# Patient Record
Sex: FEMALE | Race: WHITE | NOT HISPANIC OR LATINO
[De-identification: names, ages, dates, MRNs, and addresses within clinical notes are randomized per-mention and may not be internally consistent; named-entity substitution may affect disease eponyms.]

---

## 2018-10-12 ENCOUNTER — RESULT REVIEW (OUTPATIENT)
Age: 33
End: 2018-10-12

## 2018-11-30 ENCOUNTER — INPATIENT (INPATIENT)
Facility: HOSPITAL | Age: 33
LOS: 2 days | Discharge: HOME | End: 2018-12-03
Attending: INTERNAL MEDICINE | Admitting: INTERNAL MEDICINE
Payer: MEDICARE

## 2018-11-30 VITALS — HEIGHT: 66 IN | WEIGHT: 160.06 LBS

## 2018-11-30 DIAGNOSIS — I26.99 OTHER PULMONARY EMBOLISM WITHOUT ACUTE COR PULMONALE: ICD-10-CM

## 2018-11-30 LAB
ALBUMIN SERPL ELPH-MCNC: 5.1 G/DL — SIGNIFICANT CHANGE UP (ref 3.5–5.2)
ALP SERPL-CCNC: 110 U/L — SIGNIFICANT CHANGE UP (ref 30–115)
ALT FLD-CCNC: 21 U/L — SIGNIFICANT CHANGE UP (ref 0–41)
ANION GAP SERPL CALC-SCNC: 15 MMOL/L — HIGH (ref 7–14)
APTT BLD: 25.6 SEC — LOW (ref 27–39.2)
AST SERPL-CCNC: 19 U/L — SIGNIFICANT CHANGE UP (ref 0–41)
BASE EXCESS BLDV CALC-SCNC: 3.1 MMOL/L — HIGH (ref -2–2)
BASOPHILS # BLD AUTO: 0.06 K/UL — SIGNIFICANT CHANGE UP (ref 0–0.2)
BASOPHILS NFR BLD AUTO: 0.6 % — SIGNIFICANT CHANGE UP (ref 0–1)
BILIRUB SERPL-MCNC: 0.9 MG/DL — SIGNIFICANT CHANGE UP (ref 0.2–1.2)
BUN SERPL-MCNC: 14 MG/DL — SIGNIFICANT CHANGE UP (ref 10–20)
CA-I SERPL-SCNC: 1.27 MMOL/L — SIGNIFICANT CHANGE UP (ref 1.12–1.3)
CALCIUM SERPL-MCNC: 9.8 MG/DL — SIGNIFICANT CHANGE UP (ref 8.5–10.1)
CHLORIDE SERPL-SCNC: 102 MMOL/L — SIGNIFICANT CHANGE UP (ref 98–110)
CK MB CFR SERPL CALC: <1 NG/ML — SIGNIFICANT CHANGE UP (ref 0.6–6.3)
CK MB CFR SERPL CALC: <1 NG/ML — SIGNIFICANT CHANGE UP (ref 0.6–6.3)
CK SERPL-CCNC: 80 U/L — SIGNIFICANT CHANGE UP (ref 0–225)
CK SERPL-CCNC: 83 U/L — SIGNIFICANT CHANGE UP (ref 0–225)
CO2 SERPL-SCNC: 26 MMOL/L — SIGNIFICANT CHANGE UP (ref 17–32)
CREAT SERPL-MCNC: 0.8 MG/DL — SIGNIFICANT CHANGE UP (ref 0.7–1.5)
D DIMER BLD IA.RAPID-MCNC: 1929 NG/ML DDU — HIGH (ref 0–230)
EOSINOPHIL # BLD AUTO: 0.27 K/UL — SIGNIFICANT CHANGE UP (ref 0–0.7)
EOSINOPHIL NFR BLD AUTO: 2.8 % — SIGNIFICANT CHANGE UP (ref 0–8)
GAS PNL BLDV: 139 MMOL/L — SIGNIFICANT CHANGE UP (ref 136–145)
GAS PNL BLDV: SIGNIFICANT CHANGE UP
GLUCOSE SERPL-MCNC: 98 MG/DL — SIGNIFICANT CHANGE UP (ref 70–99)
HCO3 BLDV-SCNC: 31 MMOL/L — HIGH (ref 22–29)
HCT VFR BLD CALC: 45.5 % — SIGNIFICANT CHANGE UP (ref 37–47)
HCT VFR BLDA CALC: 73.1 % — HIGH (ref 34–44)
HGB BLD CALC-MCNC: 23.8 G/DL — CRITICAL HIGH (ref 14–18)
HGB BLD-MCNC: 15 G/DL — SIGNIFICANT CHANGE UP (ref 12–16)
HOROWITZ INDEX BLDV+IHG-RTO: 21 — SIGNIFICANT CHANGE UP
IMM GRANULOCYTES NFR BLD AUTO: 0.3 % — SIGNIFICANT CHANGE UP (ref 0.1–0.3)
INR BLD: 1.2 RATIO — SIGNIFICANT CHANGE UP (ref 0.65–1.3)
LACTATE BLDV-MCNC: 1.4 MMOL/L — SIGNIFICANT CHANGE UP (ref 0.5–1.6)
LYMPHOCYTES # BLD AUTO: 2.27 K/UL — SIGNIFICANT CHANGE UP (ref 1.2–3.4)
LYMPHOCYTES # BLD AUTO: 23.3 % — SIGNIFICANT CHANGE UP (ref 20.5–51.1)
MCHC RBC-ENTMCNC: 28.5 PG — SIGNIFICANT CHANGE UP (ref 27–31)
MCHC RBC-ENTMCNC: 33 G/DL — SIGNIFICANT CHANGE UP (ref 32–37)
MCV RBC AUTO: 86.5 FL — SIGNIFICANT CHANGE UP (ref 81–99)
MONOCYTES # BLD AUTO: 0.71 K/UL — HIGH (ref 0.1–0.6)
MONOCYTES NFR BLD AUTO: 7.3 % — SIGNIFICANT CHANGE UP (ref 1.7–9.3)
NEUTROPHILS # BLD AUTO: 6.39 K/UL — SIGNIFICANT CHANGE UP (ref 1.4–6.5)
NEUTROPHILS NFR BLD AUTO: 65.7 % — SIGNIFICANT CHANGE UP (ref 42.2–75.2)
NRBC # BLD: 0 /100 WBCS — SIGNIFICANT CHANGE UP (ref 0–0)
NT-PROBNP SERPL-SCNC: 25 PG/ML — SIGNIFICANT CHANGE UP (ref 0–300)
NT-PROBNP SERPL-SCNC: 26 PG/ML — SIGNIFICANT CHANGE UP (ref 0–300)
PCO2 BLDV: 56 MMHG — HIGH (ref 41–51)
PH BLDV: 7.36 — SIGNIFICANT CHANGE UP (ref 7.26–7.43)
PLATELET # BLD AUTO: 260 K/UL — SIGNIFICANT CHANGE UP (ref 130–400)
PO2 BLDV: 21 MMHG — SIGNIFICANT CHANGE UP (ref 20–40)
POTASSIUM BLDV-SCNC: 4.3 MMOL/L — SIGNIFICANT CHANGE UP (ref 3.3–5.6)
POTASSIUM SERPL-MCNC: 4.7 MMOL/L — SIGNIFICANT CHANGE UP (ref 3.5–5)
POTASSIUM SERPL-SCNC: 4.7 MMOL/L — SIGNIFICANT CHANGE UP (ref 3.5–5)
PROT SERPL-MCNC: 7.8 G/DL — SIGNIFICANT CHANGE UP (ref 6–8)
PROTHROM AB SERPL-ACNC: 13 SEC — HIGH (ref 9.95–12.87)
RBC # BLD: 5.26 M/UL — SIGNIFICANT CHANGE UP (ref 4.2–5.4)
RBC # FLD: 13.7 % — SIGNIFICANT CHANGE UP (ref 11.5–14.5)
SAO2 % BLDV: 28 % — SIGNIFICANT CHANGE UP
SODIUM SERPL-SCNC: 143 MMOL/L — SIGNIFICANT CHANGE UP (ref 135–146)
TROPONIN T SERPL-MCNC: <0.01 NG/ML — SIGNIFICANT CHANGE UP
WBC # BLD: 9.73 K/UL — SIGNIFICANT CHANGE UP (ref 4.8–10.8)
WBC # FLD AUTO: 9.73 K/UL — SIGNIFICANT CHANGE UP (ref 4.8–10.8)

## 2018-11-30 PROCEDURE — 93970 EXTREMITY STUDY: CPT | Mod: 26

## 2018-11-30 RX ORDER — ENOXAPARIN SODIUM 100 MG/ML
70 INJECTION SUBCUTANEOUS ONCE
Qty: 0 | Refills: 0 | Status: COMPLETED | OUTPATIENT
Start: 2018-11-30 | End: 2018-11-30

## 2018-11-30 RX ORDER — CHLORHEXIDINE GLUCONATE 213 G/1000ML
1 SOLUTION TOPICAL
Qty: 0 | Refills: 0 | Status: DISCONTINUED | OUTPATIENT
Start: 2018-11-30 | End: 2018-12-03

## 2018-11-30 RX ORDER — MORPHINE SULFATE 50 MG/1
4 CAPSULE, EXTENDED RELEASE ORAL ONCE
Qty: 0 | Refills: 0 | Status: DISCONTINUED | OUTPATIENT
Start: 2018-11-30 | End: 2018-11-30

## 2018-11-30 RX ORDER — SODIUM CHLORIDE 9 MG/ML
1000 INJECTION INTRAMUSCULAR; INTRAVENOUS; SUBCUTANEOUS
Qty: 0 | Refills: 0 | Status: DISCONTINUED | OUTPATIENT
Start: 2018-11-30 | End: 2018-12-01

## 2018-11-30 RX ORDER — ONDANSETRON 8 MG/1
4 TABLET, FILM COATED ORAL EVERY 8 HOURS
Qty: 0 | Refills: 0 | Status: DISCONTINUED | OUTPATIENT
Start: 2018-11-30 | End: 2018-12-03

## 2018-11-30 RX ORDER — WARFARIN SODIUM 2.5 MG/1
5 TABLET ORAL AT BEDTIME
Qty: 0 | Refills: 0 | Status: COMPLETED | OUTPATIENT
Start: 2018-11-30 | End: 2018-11-30

## 2018-11-30 RX ORDER — ENOXAPARIN SODIUM 100 MG/ML
70 INJECTION SUBCUTANEOUS
Qty: 0 | Refills: 0 | Status: DISCONTINUED | OUTPATIENT
Start: 2018-11-30 | End: 2018-12-03

## 2018-11-30 RX ORDER — ACETAMINOPHEN 500 MG
650 TABLET ORAL EVERY 6 HOURS
Qty: 0 | Refills: 0 | Status: DISCONTINUED | OUTPATIENT
Start: 2018-11-30 | End: 2018-12-03

## 2018-11-30 RX ADMIN — Medication 650 MILLIGRAM(S): at 22:14

## 2018-11-30 RX ADMIN — ENOXAPARIN SODIUM 70 MILLIGRAM(S): 100 INJECTION SUBCUTANEOUS at 22:16

## 2018-11-30 RX ADMIN — Medication 650 MILLIGRAM(S): at 16:35

## 2018-11-30 RX ADMIN — SODIUM CHLORIDE 100 MILLILITER(S): 9 INJECTION INTRAMUSCULAR; INTRAVENOUS; SUBCUTANEOUS at 18:52

## 2018-11-30 RX ADMIN — WARFARIN SODIUM 5 MILLIGRAM(S): 2.5 TABLET ORAL at 22:14

## 2018-11-30 RX ADMIN — Medication 650 MILLIGRAM(S): at 17:13

## 2018-11-30 RX ADMIN — ENOXAPARIN SODIUM 70 MILLIGRAM(S): 100 INJECTION SUBCUTANEOUS at 14:18

## 2018-11-30 RX ADMIN — Medication 650 MILLIGRAM(S): at 23:00

## 2018-11-30 RX ADMIN — ONDANSETRON 4 MILLIGRAM(S): 8 TABLET, FILM COATED ORAL at 17:12

## 2018-11-30 RX ADMIN — MORPHINE SULFATE 4 MILLIGRAM(S): 50 CAPSULE, EXTENDED RELEASE ORAL at 12:31

## 2018-11-30 NOTE — ED PROVIDER NOTE - NS ED ROS FT
GEN: (+) fever, (-)chills   HEENT: (-) vision changes, (-) HA, (-) sore throat  CV: (+) pleuritic chest pain, (-) palpitations, (-) edema  PULM: (+) cough, (-) wheezing, (-) dyspnea, (-) orthopnea, (-) hemoptysis   GI: (-) abdominal pain,(-) Nausea, (-) Vomiting, (-) Diarrhea, (-) Melena  NEURO: (-) weakness, (-) paresthesias, (-) syncope  : (-) dysuria, (-) frequency, (-) urgency  MS: (+) back pain, (-) joint pain, (-)myalgias, (-) swelling  SKIN: (-) rashes, (-) new lesions, (-) pruritus, (-) jaundice  HEME: (-) bleeding, (-) ecchymosis

## 2018-11-30 NOTE — ED PROVIDER NOTE - ATTENDING CONTRIBUTION TO CARE
I personally evaluated the patient. I reviewed the Resident’s or Physician Assistant’s note (as assigned above), and agree with the findings and plan except as documented in my note.     33 female here for chest pain and pleurisy. Pain is associated with cough and low grade fever responsive to tylenol.     Has been breastfeeding since a delivery 3 months ago. No other PMH    PE: female in no distress. noted to be hypoxic without supplemental oxygen on initial vitals, responsive to nasal cannula.  HEENT: non icteric. mucosa moist. CHEST: normal work of breathing. Right base with decreased sounds. No tachypnea. speech normal CV: pulses intact. SKIN: normal    Impression: hypoxia, pneumonia vs. pulmonary embolism    Plan: IV labs imaging supportive care and reevaluation

## 2018-11-30 NOTE — H&P ADULT - HISTORY OF PRESENT ILLNESS
32 yo female with PMH that includes recent delivery 9/2018 currently breastfeeding, who presented with right chest pain and right back pain 7/10 pleuritic in nature, of 1 day in duration, and asosciated with low grade temperatures. evaluation in th eER revealed hypoxemia with acute on chronic pulmonary embolism. Pulm / CCM saw in the eR and she is being admitted for anticoagulation and further management. 34 yo female with PMH that includes recent delivery 9/2018 currently breastfeeding, who presented with right chest pain and right back pain 7/10 pleuritic in nature, of 1 day in duration, and asosciated with low grade temperatures. evaluation in th eER revealed hypoxemia with acute on chronic pulmonary embolism. Pulm / CCM saw in the eR and she is being admitted for anticoagulation and further management.     no prior miscarriages  no prior pregnancy complications  no known family hx

## 2018-11-30 NOTE — H&P ADULT - NSHPLABSRESULTS_GEN_ALL_CORE
15.0   9.73  )-----------( 260      ( 30 Nov 2018 09:50 )             45.5       11-30    143  |  102  |  14  ----------------------------<  98  4.7   |  26  |  0.8    Ca    9.8      30 Nov 2018 09:50    TPro  7.8  /  Alb  5.1  /  TBili  0.9  /  DBili  x   /  AST  19  /  ALT  21  /  AlkPhos  110  11-30      CARDIAC MARKERS ( 30 Nov 2018 10:30 )  x     / <0.01 ng/mL / x     / x     / x            BNP 1929    INR 1.2    LA 1.4      CT chest:  Acute on chronic segmental and subsegmental pulmonary embolism involving   the right lower lobe and right upper lobe pulmonary arteries.  Serpiginous density in the right lower lobe, which may represent a   vascular malformation.      Le duplex:  No evidence of deep venous thrombosis or superficial thrombophlebitis in   the bilateral lower extremities.        CXR:  No radiographic evidence of acute cardiopulmonary disease.      EKG (prelim) NSR

## 2018-11-30 NOTE — CONSULT NOTE ADULT - SUBJECTIVE AND OBJECTIVE BOX
Patient is a 33y old  Female who presents with a chief complaint of pleuritic chest pain    HPI:    34 year odl 3 months postpartum presenting with pleuritic right sided chest pain.   CT chest in ED revealed acute on chornic right sided segmental and subsegmental PEs.  No evidence of strain on CT.  She received Lovenox therapeutic dose.  Duplex of the lower extremities was normal.      PAST MEDICAL & SURGICAL HISTORY:  No pertinent past medical history  No significant past surgical history      SOCIAL HX:   Smoking neg                         ETOH  neg                          Other  neg    FAMILY HISTORY:  :  No known cardiovacular family hisotry     ROS:  See HPI     Allergies    No Known Allergies    Intolerances          PHYSICAL EXAM    ICU Vital Signs Last 24 Hrs  T(C): 36.9 (30 Nov 2018 08:57), Max: 36.9 (30 Nov 2018 08:57)  T(F): 98.5 (30 Nov 2018 08:57), Max: 98.5 (30 Nov 2018 08:57)  HR: 78 (30 Nov 2018 08:57) (78 - 78)  BP: 116/56 (30 Nov 2018 08:57) (116/56 - 116/56)  BP(mean): --  ABP: --  ABP(mean): --  RR: 18 (30 Nov 2018 09:41) (17 - 18)  SpO2: 95% (30 Nov 2018 09:41) (89% - 95%)      General: In NAD   HEENT:  DEJA              Lymphatic system: No cervical LN   Lungs: Bilateral BS, clear  Cardiovascular: Regular  Gastrointestinal: Soft, Positive BS  Musculoskeletal: No clubbing.  Moves all extremities.  Full range of motion   Skin: Warm.  Intact  Neurological: No motor or sensory deficit         LABS:                          15.0   9.73  )-----------( 260      ( 30 Nov 2018 09:50 )             45.5                                               11-30    143  |  102  |  14  ----------------------------<  98  4.7   |  26  |  0.8    Ca    9.8      30 Nov 2018 09:50    TPro  7.8  /  Alb  5.1  /  TBili  0.9  /  DBili  x   /  AST  19  /  ALT  21  /  AlkPhos  110  11-30      PT/INR - ( 30 Nov 2018 14:07 )   PT: 13.00 sec;   INR: 1.20 ratio         PTT - ( 30 Nov 2018 14:07 )  PTT:25.6 sec                                           CARDIAC MARKERS ( 30 Nov 2018 10:30 )  x     / <0.01 ng/mL / x     / x     / x                                                LIVER FUNCTIONS - ( 30 Nov 2018 09:50 )  Alb: 5.1 g/dL / Pro: 7.8 g/dL / ALK PHOS: 110 U/L / ALT: 21 U/L / AST: 19 U/L / GGT: x                                                                                                                                       X-Rays   clear                                                                                  ECHO  Pending    MEDICATIONS  (STANDING):    MEDICATIONS  (PRN): Patient is a 33y old  Female who presents with a chief complaint of pleuritic chest pain    HPI:    34 year old 3 months postpartum presenting with pleuritic right sided chest pain.   CT chest in ED revealed acute on chornic right sided segmental and subsegmental PEs.  No evidence of strain on CT.  She received Lovenox therapeutic dose.  Duplex of the lower extremities was normal.      PAST MEDICAL & SURGICAL HISTORY:  No pertinent past medical history  No significant past surgical history      SOCIAL HX:   Smoking neg                         ETOH  neg                          Other  neg    FAMILY HISTORY:  :  No known cardiovacular family hisotry     ROS:  See HPI     Allergies    No Known Allergies    Intolerances          PHYSICAL EXAM    ICU Vital Signs Last 24 Hrs  T(C): 36.9 (30 Nov 2018 08:57), Max: 36.9 (30 Nov 2018 08:57)  T(F): 98.5 (30 Nov 2018 08:57), Max: 98.5 (30 Nov 2018 08:57)  HR: 78 (30 Nov 2018 08:57) (78 - 78)  BP: 116/56 (30 Nov 2018 08:57) (116/56 - 116/56)  BP(mean): --  ABP: --  ABP(mean): --  RR: 18 (30 Nov 2018 09:41) (17 - 18)  SpO2: 95% (30 Nov 2018 09:41) (89% - 95%)      General: In NAD   HEENT:  DEJA              Lymphatic system: No cervical LN   Lungs: Bilateral BS, clear  Cardiovascular: Regular  Gastrointestinal: Soft, Positive BS  Musculoskeletal: No clubbing.  Moves all extremities.  Full range of motion   Skin: Warm.  Intact  Neurological: No motor or sensory deficit         LABS:                          15.0   9.73  )-----------( 260      ( 30 Nov 2018 09:50 )             45.5                                               11-30    143  |  102  |  14  ----------------------------<  98  4.7   |  26  |  0.8    Ca    9.8      30 Nov 2018 09:50    TPro  7.8  /  Alb  5.1  /  TBili  0.9  /  DBili  x   /  AST  19  /  ALT  21  /  AlkPhos  110  11-30      PT/INR - ( 30 Nov 2018 14:07 )   PT: 13.00 sec;   INR: 1.20 ratio         PTT - ( 30 Nov 2018 14:07 )  PTT:25.6 sec                                           CARDIAC MARKERS ( 30 Nov 2018 10:30 )  x     / <0.01 ng/mL / x     / x     / x                                                LIVER FUNCTIONS - ( 30 Nov 2018 09:50 )  Alb: 5.1 g/dL / Pro: 7.8 g/dL / ALK PHOS: 110 U/L / ALT: 21 U/L / AST: 19 U/L / GGT: x                                                                                                                                       X-Rays   clear                                                                                  ECHO  Pending    MEDICATIONS  (STANDING):    MEDICATIONS  (PRN):

## 2018-11-30 NOTE — ED ADULT NURSE NOTE - NSIMPLEMENTINTERV_GEN_ALL_ED
Implemented All Universal Safety Interventions:  [place] to call system. Call bell, personal items and telephone within reach. Instruct patient to call for assistance. Room bathroom lighting operational. Non-slip footwear when patient is off stretcher. Physically safe environment: no spills, clutter or unnecessary equipment. Stretcher in lowest position, wheels locked, appropriate side rails in place.

## 2018-11-30 NOTE — H&P ADULT - NSHPPHYSICALEXAM_GEN_ALL_CORE
Vital Signs Last 24 Hrs  T(C): 38.1 (30 Nov 2018 16:12), Max: 38.1 (30 Nov 2018 16:12)  T(F): 100.6 (30 Nov 2018 16:12), Max: 100.6 (30 Nov 2018 16:12)  HR: 80 (30 Nov 2018 16:12) (78 - 80)  BP: 114/64 (30 Nov 2018 16:12) (114/64 - 116/56)  BP(mean): --  RR: 16 (30 Nov 2018 16:12) (16 - 18)  SpO2: 95% (30 Nov 2018 09:41) (89% - 95%)      PHYSICAL EXAM:  GENERAL: NAD, well-groomed, well-developed  HEAD = atraumatic, normoocehalic  EYES: conjunctiva and sclera clear  NECK: Supple, No JVD  NERVOUS SYSTEM:  Alert & Oriented X3, Good concentration  CHEST/LUNG: Clear to ausculatation bilaterally; No rales, rhonchi, wheezing, or rubs  HEART: Regular rate and rhythm; No murmurs, rubs, or gallops  ABDOMEN: Soft, Nontender, Nondistended; Bowel sounds present  EXTREMITIES:  BL No clubbing, cyanosis, or edema Vital Signs Last 24 Hrs  T(C): 38.1 (30 Nov 2018 16:12), Max: 38.1 (30 Nov 2018 16:12)  T(F): 100.6 (30 Nov 2018 16:12), Max: 100.6 (30 Nov 2018 16:12)  HR: 80 (30 Nov 2018 16:12) (78 - 80)  BP: 114/64 (30 Nov 2018 16:12) (114/64 - 116/56)  BP(mean): --  RR: 16 (30 Nov 2018 16:12) (16 - 18)  SpO2: 95% (30 Nov 2018 09:41) (89% - 95%)      PHYSICAL EXAM:  GENERAL: uncomfortable but no distress, well-groomed, well-developed  HEAD = atraumatic, normoocehalic  EYES: conjunctiva and sclera clear  NECK: Supple, No JVD  NERVOUS SYSTEM:  Alert & Oriented X3, Good concentration  CHEST/LUNG: Clear to ausculatation bilaterally; No rales, rhonchi, wheezing, or rubs. + right sided chest pain to movement and breathing. no rash over the right chest. no breast tendeess  HEART: Regular rate and rhythm; No murmurs, rubs, or gallops  ABDOMEN: Soft, Nontender, Nondistended; Bowel sounds present  EXTREMITIES:  BL No clubbing, cyanosis, or edema Vital Signs Last 24 Hrs  T(C): 38.1 (30 Nov 2018 16:12), Max: 38.1 (30 Nov 2018 16:12)  T(F): 100.6 (30 Nov 2018 16:12), Max: 100.6 (30 Nov 2018 16:12)  HR: 80 (30 Nov 2018 16:12) (78 - 80)  BP: 114/64 (30 Nov 2018 16:12) (114/64 - 116/56)  BP(mean): --  RR: 16 (30 Nov 2018 16:12) (16 - 18)  SpO2: 95% (30 Nov 2018 09:41) (89% - 95%)      PHYSICAL EXAM:  GENERAL: uncomfortable but no distress, well-groomed, well-developed  HEAD = atraumatic, normoocehalic  EYES: conjunctiva and sclera clear  NECK: Supple, No JVD  NERVOUS SYSTEM:  Alert & Oriented X3, Good concentration. CN 2-12 intact  CHEST/LUNG: Clear to ausculatation bilaterally; No rales, rhonchi, wheezing, or rubs. + right sided chest pain to movement and breathing. no rash over the right chest. no breast tendeess  HEART: Regular rate and rhythm; No murmurs, rubs, or gallops  ABDOMEN: Soft, Nontender, Nondistended; Bowel sounds present  EXTREMITIES:  BL No clubbing, cyanosis, or edema. no calf tenderess BLE

## 2018-11-30 NOTE — ED ADULT TRIAGE NOTE - CHIEF COMPLAINT QUOTE
patient C/o of right upper back pain that radiates to right side of chest, described as sharp, 7/10, that began yesterday night while breathing, denies shortness of breath but states can't deep breath because increases pain

## 2018-11-30 NOTE — ED PROVIDER NOTE - MEDICAL DECISION MAKING DETAILS
33 female here for pleurisy and hypoxia found to have acute on chronic PE with normal biomarkers and no end organ failure will admit for pulmonary embolism and start anticoagulation. 33 female here for pleurisy and hypoxia found to have acute on chronic PE with normal biomarkers and no end organ failure will admit for pulmonary embolism and start anticoagulation. Case d/w Dr. Navarro for disposition.

## 2018-11-30 NOTE — ED PROVIDER NOTE - PROGRESS NOTE DETAILS
MANNY Jackson: I was directly involved in the care and management of this patient while supervising PA Fellow vascular duplex negative as per tech

## 2018-11-30 NOTE — H&P ADULT - NSHPREVIEWOFSYSTEMS_GEN_ALL_CORE
says got morphine in the Er for chest pain  alleviated the chest pain, but got nauseated with a heachace  no dizziness  no voting  no blurry vision  got tylenol, with improvement in the headache    no chills. had a recent uri type symptms with dry coughing  ros othersie negatibve    no vaginal bleeding.  has not gotten period yet  tells me had a negative pregnancy test downstairs

## 2018-11-30 NOTE — CONSULT NOTE ADULT - ASSESSMENT
IMPRESSION:    Provoked PE ?   Risk is usually highest within the 6 weeks post-partum  No DVT      PLAN:    CNS: no depressants    HEENT: Oral care    PULMONARY:  HOB @ 45 degrees, start therapeutic Lovenox, can switch to coumadin, as NOACs are not recommended in breastfeeding period    CARDIOVASCULAR: check 2Decho, trend cardiac enzymes, check BNP    GI: GI prophylaxis.  Feeding PO diet    RENAL:  Follow up lytes.  Correct as needed    HEMATOLOGICAL:  Therapeutic Lovenox for now    MUSCULOSKELETAL: out of bed    Telemetry monitoring IMPRESSION:    Provoked PE ?   Risk is usually highest within the 6 weeks post-partum  No DVT  No R heart strain    PLAN:    CNS: no depressants    HEENT: Oral care    PULMONARY:  HOB @ 45 degrees,   start therapeutic Lovenox,   can switch to coumadin with bridging,   NOACs are not recommended in breastfeeding period    CARDIOVASCULAR: check 2Decho, trend cardiac enzymes, check BNP    GI: GI prophylaxis.  Feeding PO diet    RENAL:  Follow up lytes.  Correct as needed    HEMATOLOGICAL:  Therapeutic Lovenox for now    MUSCULOSKELETAL: out of bed    Telemetry monitoring

## 2018-11-30 NOTE — ED PROVIDER NOTE - PHYSICAL EXAMINATION
GEN: Alert & Oriented x 3, No acute distress. Calm, appropriate.  Head and Neck:  No cervical lymphadenopathy.  ENT: Oral mucosa pink, moist without lesions.   Eyes: PERRL. No conjunctival injection. No scleral icterus.   RESP: Decreased breath sounds over right lung field. Lungs clear to auscult bilat. no wheezes, rhonchi or rales. No retractions.   CARDIO: regular rate and rhythm, no murmurs, rubs or gallops. Normal S1, S2. Radial pulses 2+ bilaterally. No lower extremity edema.  ABD: Soft, Nondistended. No rebound tenderness/guarding. No pulsatile mass. No tenderness with light and deep palpation.  MS: Full ROM of extremities. No calf tenderness.   SKIN: no rashes/lesions, no petechiae, no ecchymosis.  NEURO: CN II-XII grossly intact.  Speech and cognition normal. GEN: Alert & Oriented x 3, No acute distress. Calm, appropriate.  Head and Neck:  No cervical lymphadenopathy.  ENT: Oral mucosa pink, moist without lesions.   Eyes: PERRL. No conjunctival injection. No scleral icterus.   RESP: Decreased breath sounds over right lung field. Lungs clear to auscult bilat. no wheezes, rhonchi or rales. No retractions.   CARDIO: regular rate and rhythm, no murmurs, rubs or gallops. Normal S1, S2. Radial pulses 2+ bilaterally. No lower extremity edema. Pain not reproducible with palpation of chest wall.  ABD: Soft, Nondistended. No rebound tenderness/guarding. No pulsatile mass. No tenderness with light and deep palpation.  MS: Full ROM of extremities. No calf tenderness. No midline tenderness.   SKIN: no rashes/lesions, no petechiae, no ecchymosis.  NEURO: CN II-XII grossly intact. Speech and cognition normal.

## 2018-11-30 NOTE — H&P ADULT - ASSESSMENT
34 yo female with PMH that includes recent delivery 9/2018 currently breastfeeding, who presented with pleutic pain and fever and found with acute on chronic PE      # Acute on chronic PE  - cont therapeutic anticoagulation with lovenox 1mg/kg q12  - coumadin to be started  - nutrtion to reinforce coumadin diet  - will need coumadin outpatient followup established  - check TTE  - check BNP  - check serial CE tonight and in AM  - close monitoring  - heme eval given postpartum state and evidence for chronic PE underneath the acute process  ----- hypoxemia imprioved, use o2 prn  - f/u EKG  ----- discussed with patient re pump and discard for now till plan further delineated      # low grade temp: could be PE related vs otyher  - check UA Ucx for completeness      # Serpiginous density in the right lower lobe, which may represent a vascular malformation. I d/w pulm and was advised does not represent an contra-indication to AC      # Coordination of care:  - VTE ppx: on systemic AC  - CHG bath per routine  - OOB to chair  ---- HIV screening 32 yo female with PMH that includes recent delivery 9/2018 currently breastfeeding, who presented with pleutic pain and fever and found with acute on chronic PE      # Acute on chronic PE  - cont therapeutic anticoagulation with lovenox 1mg/kg q12  - coumadin to be started  - nutrtion to reinforce coumadin diet  - will need coumadin outpatient followup established  - check TTE  - check BNP  - check serial CE tonight and in AM  - close monitoring  - heme eval given postpartum state and evidence for chronic PE underneath the acute process  - hypoxemia resolved, use o2 prn  - f/u EKG  - discussed with patient re pump and discard for now till plan further delineated      # low grade temp: could be PE related vs otyher  - check UA Ucx for completeness      # Serpiginous density in the right lower lobe, which may represent a vascular malformation. I d/w pulm and was advised does not represent an contra-indication to AC      # Coordination of care:  - VTE ppx: on systemic AC  - CHG bath per routine  - OOB to chair 34 yo female with PMH that includes recent delivery 9/2018 currently breastfeeding, who presented with pleutic pain and fever and found with acute on chronic PE      # Acute on chronic PE  - cont therapeutic anticoagulation with lovenox 1mg/kg q12  - coumadin to be started  - nutrtion to reinforce coumadin diet  - will need coumadin outpatient followup established  - check TTE  - check BNP  - check serial CE tonight and in AM  - close monitoring  - heme eval given postpartum state and evidence for chronic PE underneath the acute process  - hypoxemia resolved, use o2 prn  - f/u EKG  - discussed with patient re pump and discard for now till plan further delineated      # low grade temp: could be PE related vs otyher  - check UA Ucx for completeness      # Serpiginous density in the right lower lobe, which may represent a vascular malformation. I d/w pulm and was advised does not represent an contra-indication to AC      # Coordination of care:  - VTE ppx: on systemic AC  - CHG bath per routine  - OOB to chair  - discussed no preg on coumading and to f/u gyn

## 2018-11-30 NOTE — ED PROVIDER NOTE - OBJECTIVE STATEMENT
The patient is a 33y Female with no significant PMH presenting to ED with pleuritic chest pain x 1 day. Patient states she developed sharp right upper back pain suddenly yesterday that radiates to right side of chest. She states she has had a semi-productive cough over the last couple of days that has improved. She states she had a fever of 100.9 yesterday and last took tylenol at 6am. She denies any recent travel, leg swelling, calf pain, and hormone use, shortness of breath, abdominal pain, & urinary changes. The patient is a 33y Female with no significant PMH presenting to ED with pleuritic chest pain x 1 day. Patient states she developed sharp right upper back pain suddenly yesterday that radiates to right side of chest. She states she has had a semi-productive cough over the last couple of days that has improved. She states she had a fever of 100.9 yesterday and last took tylenol at 6am today. She denies any recent travel, leg swelling, calf pain, hormone use, trauma, shortness of breath, abdominal pain, & urinary changes. The patient is a 33y Female with no significant PMH presenting to ED with pleuritic chest pain x 1 day. Patient states she developed sharp right upper back pain suddenly yesterday that radiates to right side of chest. She states she has had a semi-productive cough over the last couple of days that has improved. She states she had a fever of 100.9 yesterday and last took tylenol at 6am today. She denies any recent travel, leg swelling, calf pain, hormone use, trauma, shortness of breath, abdominal pain, & urinary changes. Patient is currently breastfeeding and recently delivered in September.

## 2018-12-01 LAB
ALBUMIN SERPL ELPH-MCNC: 4.3 G/DL — SIGNIFICANT CHANGE UP (ref 3.5–5.2)
ALLERGY+IMMUNOLOGY DIAG STUDY NOTE: SIGNIFICANT CHANGE UP
ALP SERPL-CCNC: 95 U/L — SIGNIFICANT CHANGE UP (ref 30–115)
ALT FLD-CCNC: 17 U/L — SIGNIFICANT CHANGE UP (ref 0–41)
ANION GAP SERPL CALC-SCNC: 15 MMOL/L — HIGH (ref 7–14)
APTT BLD: 31.4 SEC — SIGNIFICANT CHANGE UP (ref 27–39.2)
AST SERPL-CCNC: 16 U/L — SIGNIFICANT CHANGE UP (ref 0–41)
BASOPHILS # BLD AUTO: 0.03 K/UL — SIGNIFICANT CHANGE UP (ref 0–0.2)
BASOPHILS NFR BLD AUTO: 0.4 % — SIGNIFICANT CHANGE UP (ref 0–1)
BILIRUB SERPL-MCNC: 1 MG/DL — SIGNIFICANT CHANGE UP (ref 0.2–1.2)
BUN SERPL-MCNC: 12 MG/DL — SIGNIFICANT CHANGE UP (ref 10–20)
CALCIUM SERPL-MCNC: 9 MG/DL — SIGNIFICANT CHANGE UP (ref 8.5–10.1)
CHLORIDE SERPL-SCNC: 102 MMOL/L — SIGNIFICANT CHANGE UP (ref 98–110)
CK MB CFR SERPL CALC: <1 NG/ML — SIGNIFICANT CHANGE UP (ref 0.6–6.3)
CK SERPL-CCNC: 61 U/L — SIGNIFICANT CHANGE UP (ref 0–225)
CO2 SERPL-SCNC: 25 MMOL/L — SIGNIFICANT CHANGE UP (ref 17–32)
CREAT SERPL-MCNC: 0.7 MG/DL — SIGNIFICANT CHANGE UP (ref 0.7–1.5)
EOSINOPHIL # BLD AUTO: 0.22 K/UL — SIGNIFICANT CHANGE UP (ref 0–0.7)
EOSINOPHIL NFR BLD AUTO: 2.6 % — SIGNIFICANT CHANGE UP (ref 0–8)
GLUCOSE SERPL-MCNC: 86 MG/DL — SIGNIFICANT CHANGE UP (ref 70–99)
HCT VFR BLD CALC: 39 % — SIGNIFICANT CHANGE UP (ref 37–47)
HCT VFR BLD CALC: 41.5 % — SIGNIFICANT CHANGE UP (ref 37–47)
HGB BLD-MCNC: 12.9 G/DL — SIGNIFICANT CHANGE UP (ref 12–16)
HGB BLD-MCNC: 13.6 G/DL — SIGNIFICANT CHANGE UP (ref 12–16)
IMM GRANULOCYTES NFR BLD AUTO: 0.2 % — SIGNIFICANT CHANGE UP (ref 0.1–0.3)
INR BLD: 1.25 RATIO — SIGNIFICANT CHANGE UP (ref 0.65–1.3)
LYMPHOCYTES # BLD AUTO: 2.08 K/UL — SIGNIFICANT CHANGE UP (ref 1.2–3.4)
LYMPHOCYTES # BLD AUTO: 24.3 % — SIGNIFICANT CHANGE UP (ref 20.5–51.1)
MAGNESIUM SERPL-MCNC: 2.3 MG/DL — SIGNIFICANT CHANGE UP (ref 1.8–2.4)
MCHC RBC-ENTMCNC: 28.9 PG — SIGNIFICANT CHANGE UP (ref 27–31)
MCHC RBC-ENTMCNC: 29.1 PG — SIGNIFICANT CHANGE UP (ref 27–31)
MCHC RBC-ENTMCNC: 32.8 G/DL — SIGNIFICANT CHANGE UP (ref 32–37)
MCHC RBC-ENTMCNC: 33.1 G/DL — SIGNIFICANT CHANGE UP (ref 32–37)
MCV RBC AUTO: 88 FL — SIGNIFICANT CHANGE UP (ref 81–99)
MCV RBC AUTO: 88.1 FL — SIGNIFICANT CHANGE UP (ref 81–99)
MONOCYTES # BLD AUTO: 0.65 K/UL — HIGH (ref 0.1–0.6)
MONOCYTES NFR BLD AUTO: 7.6 % — SIGNIFICANT CHANGE UP (ref 1.7–9.3)
NEUTROPHILS # BLD AUTO: 5.56 K/UL — SIGNIFICANT CHANGE UP (ref 1.4–6.5)
NEUTROPHILS NFR BLD AUTO: 64.9 % — SIGNIFICANT CHANGE UP (ref 42.2–75.2)
NRBC # BLD: 0 /100 WBCS — SIGNIFICANT CHANGE UP (ref 0–0)
NRBC # BLD: 0 /100 WBCS — SIGNIFICANT CHANGE UP (ref 0–0)
PLATELET # BLD AUTO: 228 K/UL — SIGNIFICANT CHANGE UP (ref 130–400)
PLATELET # BLD AUTO: 237 K/UL — SIGNIFICANT CHANGE UP (ref 130–400)
POTASSIUM SERPL-MCNC: 4.1 MMOL/L — SIGNIFICANT CHANGE UP (ref 3.5–5)
POTASSIUM SERPL-SCNC: 4.1 MMOL/L — SIGNIFICANT CHANGE UP (ref 3.5–5)
PROT SERPL-MCNC: 6.8 G/DL — SIGNIFICANT CHANGE UP (ref 6–8)
PROTHROM AB SERPL-ACNC: 13.6 SEC — HIGH (ref 9.95–12.87)
RBC # BLD: 4.43 M/UL — SIGNIFICANT CHANGE UP (ref 4.2–5.4)
RBC # BLD: 4.71 M/UL — SIGNIFICANT CHANGE UP (ref 4.2–5.4)
RBC # FLD: 13.6 % — SIGNIFICANT CHANGE UP (ref 11.5–14.5)
RBC # FLD: 13.8 % — SIGNIFICANT CHANGE UP (ref 11.5–14.5)
SODIUM SERPL-SCNC: 142 MMOL/L — SIGNIFICANT CHANGE UP (ref 135–146)
TROPONIN T SERPL-MCNC: <0.01 NG/ML — SIGNIFICANT CHANGE UP
TYPE + AB SCN PNL BLD: SIGNIFICANT CHANGE UP
WBC # BLD: 7.72 K/UL — SIGNIFICANT CHANGE UP (ref 4.8–10.8)
WBC # BLD: 8.56 K/UL — SIGNIFICANT CHANGE UP (ref 4.8–10.8)
WBC # FLD AUTO: 7.72 K/UL — SIGNIFICANT CHANGE UP (ref 4.8–10.8)
WBC # FLD AUTO: 8.56 K/UL — SIGNIFICANT CHANGE UP (ref 4.8–10.8)

## 2018-12-01 RX ORDER — WARFARIN SODIUM 2.5 MG/1
5 TABLET ORAL AT BEDTIME
Qty: 0 | Refills: 0 | Status: COMPLETED | OUTPATIENT
Start: 2018-12-01 | End: 2018-12-01

## 2018-12-01 RX ADMIN — Medication 650 MILLIGRAM(S): at 06:25

## 2018-12-01 RX ADMIN — Medication 650 MILLIGRAM(S): at 13:08

## 2018-12-01 RX ADMIN — WARFARIN SODIUM 5 MILLIGRAM(S): 2.5 TABLET ORAL at 23:04

## 2018-12-01 RX ADMIN — Medication 650 MILLIGRAM(S): at 23:45

## 2018-12-01 RX ADMIN — ENOXAPARIN SODIUM 70 MILLIGRAM(S): 100 INJECTION SUBCUTANEOUS at 12:16

## 2018-12-01 RX ADMIN — ENOXAPARIN SODIUM 70 MILLIGRAM(S): 100 INJECTION SUBCUTANEOUS at 23:04

## 2018-12-01 RX ADMIN — SODIUM CHLORIDE 100 MILLILITER(S): 9 INJECTION INTRAMUSCULAR; INTRAVENOUS; SUBCUTANEOUS at 06:09

## 2018-12-01 RX ADMIN — Medication 650 MILLIGRAM(S): at 23:18

## 2018-12-01 NOTE — PROGRESS NOTE ADULT - SUBJECTIVE AND OBJECTIVE BOX
CC: f/u headache and chest pain      INTERVAL EVENTS INCLUDE:  prelim CT read was negative. however overnight final read came back indeterminate  My colleague discussed with radiologist who suggested MRI vs repeat CT head  Seen with change of shift alongside my colleague at the bedside, reports headache persists but is much improved  on exam with subtle asymmetry of eye movement      ROS:      Vital Signs Last 24 Hrs  T(C): 36.2 (01 Dec 2018 05:15), Max: 38.1 (30 Nov 2018 16:12)  T(F): 97.1 (01 Dec 2018 05:15), Max: 100.6 (30 Nov 2018 16:12)  HR: 73 (01 Dec 2018 05:15) (61 - 80)  BP: 109/61 (01 Dec 2018 05:15) (93/54 - 116/56)  BP(mean): --  RR: 16 (01 Dec 2018 05:15) (16 - 18)  SpO2: 95% (01 Dec 2018 06:13) (89% - 96%)      PHYSICAL EXAM:  GENERAL: NAD, well-groomed, well-developed  HEAD = atraumatic, normoocehalic  EYES: conjunctiva and sclera clear  NECK: Supple, No JVD  NERVOUS SYSTEM:  Alert & Oriented X3, Good concentration  CHEST/LUNG: Clear to ausculatation bilaterally; No rales, rhonchi, wheezing, or rubs  HEART: Regular rate and rhythm; No murmurs, rubs, or gallops  ABDOMEN: Soft, Nontender, Nondistended; Bowel sounds present  EXTREMITIES:  BL No clubbing, cyanosis, or edema        DATA:    AM labs pending    CARDIAC MARKERS ( 30 Nov 2018 21:50 )  x     / <0.01 ng/mL / 80 U/L / x     / <1.0 ng/mL  CARDIAC MARKERS ( 30 Nov 2018 19:22 )  x     / <0.01 ng/mL / 83 U/L / x     / <1.0 ng/mL  CARDIAC MARKERS ( 30 Nov 2018 10:30 )  x     / <0.01 ng/mL / x     / x     / x        BNP negative  serial cardiac enzymes negative             CT head:  Artifact degraded exam.  Small intracranial hemorrhage is difficult to exclude. Correlation with   MRI may be of benefit. CC: f/u headache and chest pain      INTERVAL EVENTS INCLUDE:  prelim CT read was negative. however overnight final read came back indeterminate  My colleague discussed with radiologist who suggested MRI vs repeat CT head  Seen with change of shift alongside my colleague at the bedside, reports headache persists but is much improved  on exam with subtle asymmetry of eye movement      ROS:  right chest pain better, now pleuritic pain is at the right shoulder  headache top of head and occiopatal, better but not resolved. no dozziness or blurriy vision  no other complaints  we discussed NOAC, coumadin, lovenox breastfeeding implications      Vital Signs Last 24 Hrs  T(C): 36.2 (01 Dec 2018 05:15), Max: 38.1 (30 Nov 2018 16:12)  T(F): 97.1 (01 Dec 2018 05:15), Max: 100.6 (30 Nov 2018 16:12)  HR: 73 (01 Dec 2018 05:15) (61 - 80)  BP: 109/61 (01 Dec 2018 05:15) (93/54 - 116/56)  BP(mean): --  RR: 16 (01 Dec 2018 05:15) (16 - 18)  SpO2: 95% (01 Dec 2018 06:13) (89% - 96%)      PHYSICAL EXAM:  GENERAL: NAD, well-groomed, well-developed  HEAD = atraumatic, normoocehalic  EYES: conjunctiva and sclera clear  NECK: Supple, No JVD  NERVOUS SYSTEM:  Alert & Oriented X3, Good concentration, able to move all ext. there is aslight assymetry in medial deviation of left pupil, subtle and possibly chronic. tongue midline  CHEST/LUNG: Clear to ausculatation bilaterally; No rales, rhonchi, wheezing, or rubs  HEART: Regular rate and rhythm; No murmurs, rubs, or gallops  ABDOMEN: Soft, Nontender, Nondistended; Bowel sounds present  EXTREMITIES:  BL No clubbing, cyanosis, or edema BLE        DATA:    AM labs pending    CARDIAC MARKERS ( 30 Nov 2018 21:50 )  x     / <0.01 ng/mL / 80 U/L / x     / <1.0 ng/mL  CARDIAC MARKERS ( 30 Nov 2018 19:22 )  x     / <0.01 ng/mL / 83 U/L / x     / <1.0 ng/mL  CARDIAC MARKERS ( 30 Nov 2018 10:30 )  x     / <0.01 ng/mL / x     / x     / x        BNP negative  serial cardiac enzymes negative             CT head:  Artifact degraded exam.  Small intracranial hemorrhage is difficult to exclude. Correlation with   MRI may be of benefit.

## 2018-12-01 NOTE — CONSULT NOTE ADULT - SUBJECTIVE AND OBJECTIVE BOX
Patient is a 33y old  Female who presents with a chief complaint of pleuritic chest pain    HPI:    34 year old 3 months postpartum presenting with pleuritic right sided chest pain.   CT chest in ED revealed acute on chornic right sided segmental and subsegmental PEs.  No evidence of strain on CT.  She received Lovenox therapeutic dose, now on coumadin  Duplex of the lower extremities was normal.    nO PRIOR h/O dvt/pe  H/o 3 normal pregnancies.  No recurrent miscarriages.  Took OCPs for 10 years without ant complications.  No family H/O thrombophilia      PAST MEDICAL & SURGICAL HISTORY:  No pertinent past medical history  No significant past surgical history      SOCIAL HX:   Smoking neg                         ETOH  neg                          Other  neg    FAMILY HISTORY:  :  No known cardiovascular family hisotry     ROS:  See HPI     Allergies    No Known Allergies    Intolerances          PHYSICAL EXAM    ICU Vital Signs Last 24 Hrs  T(C): 36.3 (01 Dec 2018 14:12), Max: 36.8 (30 Nov 2018 22:46)  T(F): 97.3 (01 Dec 2018 14:12), Max: 98.2 (30 Nov 2018 22:46)  HR: 73 (01 Dec 2018 14:12) (61 - 73)  BP: 103/54 (01 Dec 2018 14:12) (93/54 - 109/61)  BP(mean): --  ABP: --  ABP(mean): --  RR: 16 (01 Dec 2018 14:12) (16 - 16)  SpO2: 95% (01 Dec 2018 06:13) (95% - 96%)      General: In NAD   HEENT:  DEJA              Lymphatic system: No cervical LN   Lungs: Bilateral BS, clear  Cardiovascular: Regular  Gastrointestinal: Soft, Positive BS  Musculoskeletal: No clubbing.  Moves all extremities.  Full range of motion   Skin: Warm.  Intact  No lymphadenopathy  Neurological: No motor or sensory deficit         LABS:                          15.0   9.73  )-----------( 260      ( 30 Nov 2018 09:50 )             45.5                                               11-30    143  |  102  |  14  ----------------------------<  98  4.7   |  26  |  0.8    Ca    9.8      30 Nov 2018 09:50    TPro  7.8  /  Alb  5.1  /  TBili  0.9  /  DBili  x   /  AST  19  /  ALT  21  /  AlkPhos  110  11-30      PT/INR - ( 30 Nov 2018 14:07 )   PT: 13.00 sec;   INR: 1.20 ratio         PTT - ( 30 Nov 2018 14:07 )  PTT:25.6 sec                                           CARDIAC MARKERS ( 30 Nov 2018 10:30 )  x     / <0.01 ng/mL / x     / x     / x                                                LIVER FUNCTIONS - ( 30 Nov 2018 09:50 )  Alb: 5.1 g/dL / Pro: 7.8 g/dL / ALK PHOS: 110 U/L / ALT: 21 U/L / AST: 19 U/L / GGT: x                                                                                                                                       X-Rays   clear                                                                                  ECHO  Pending    MEDICATIONS  (STANDING):    MEDICATIONS  (PRN):

## 2018-12-01 NOTE — CONSULT NOTE ADULT - SUBJECTIVE AND OBJECTIVE BOX
HUNTER BAUM     33y     Female    [id number]                                                           CC:Patient is a 33y old  Female who presents with a chief complaint of dyspnea/ CHEST PAIN (01 Dec 2018 16:20)      HPI:  32 yo female with PMH that includes recent delivery 9/2018 currently breastfeeding, who presented with right chest pain and right back pain 7/10 pleuritic in nature, of 1 day in duration, and asosciated with low grade temperatures. evaluation in th eER revealed hypoxemia with acute on chronic pulmonary embolism. Pulm / CCM saw in the eR and she is being admitted for anticoagulation and further management.     no prior miscarriages  no prior pregnancy complications  no known family hx (30 Nov 2018 16:41)    Patient seen and examined and history obtain from medical attending and patient.  Neurology called for headache with possibly abnormal CTH.  Had CTH x2 and was read as normal or edema.  Could not rule underlying blood.  As she was on anticoagulation for PE neurology was called to clarify if further testing was needed.  Patients HA 2/10 in intensity and in back of head.  Usually gets 1 headache a month lasting 1-2 days and associated with nausea and light and sound sensitivity.    ROS:  Constitutional, Neurological, Psychiatric, Eyes, ENT, Cardiovascular, Respiratory, Gastrointestinal, Genitourinary, Musculoskeletal, Integumentary, Endocrine and Heme/Lymph are otherwise negative. Except for as noted      FAMILY HISTORY:  No pertinent family history in first degree relatives      HEALTH ISSUES - PROBLEM Dx:  Pulmonary thromboembolism    SHx: no smoking social drinking and no drug use      Vital Signs Last 24 Hrs  T(C): 36.3 (01 Dec 2018 14:12), Max: 36.8 (30 Nov 2018 22:46)  T(F): 97.3 (01 Dec 2018 14:12), Max: 98.2 (30 Nov 2018 22:46)  HR: 73 (01 Dec 2018 14:12) (61 - 73)  BP: 103/54 (01 Dec 2018 14:12) (93/54 - 109/61)  BP(mean): --  RR: 16 (01 Dec 2018 14:12) (16 - 16)  SpO2: 95% (01 Dec 2018 06:13) (95% - 96%)    Physical Exam:  Constitutional: alert and in no acute distress.  Eyes: the sclera and conjunctiva were normal, pupils were equal in size, round, reactive to light, with normal accommodation and extraocular movements were intact.   Back: no costovertebral angle tenderness and no spinal tenderness.      Neuro Exam:  Orientation: oriented to person, oriented to place and oriented to time.   Attention: normal concentrating ability and visual attention was not decreased.   Language: no difficulty naming common objects, no difficulty repeating a phrase, no difficulty writing a sentence, fluency intact, comprehension intact and reading intact.   Fund of knowledge: displays adequate knowledge of personal past history.   Cranial Nerves: visual acuity intact bilaterally, visual fields full to confrontation, pupils equal round and reactive to light, extraocular motion intact, facial sensation intact symmetrically, face symmetrical, hearing was intact bilaterally, tongue and palate midline, head turning and shoulder shrug symmetric and there was no tongue deviation with protrusion.   Motor: muscle tone was normal in all four extremities, muscle strength was normal in all four extremities and normal bulk in all four extremities.   Sensory exam: light touch was intact.   Coordination:. normal gait. balance was intact. there was no past-pointing. no tremor present.   Deep tendon reflexes:   Biceps right 2+. Biceps left 2+.    Triceps right 2+. Triceps left 2+.  LOC  Brachioradialis right 2+. Brachioradialis left 2+.    Patella right 2+. Patella left 2+.    Ankle jerk right 2+. Ankle jerk left 2+.   Plantar responses normal on the right, normal on the left.      NIHSS:0     Allergies    No Known Allergies    Intolerances       Home Medications:  multivitamin:  (30 Nov 2018 08:58)      MEDICATIONS  (STANDING):  chlorhexidine 4% Liquid 1 Application(s) Topical <User Schedule>  enoxaparin Injectable 70 milliGRAM(s) SubCutaneous <User Schedule>  warfarin 5 milliGRAM(s) Oral at bedtime    MEDICATIONS  (PRN):  acetaminophen   Tablet .. 650 milliGRAM(s) Oral every 6 hours PRN Temp greater or equal to 38C (100.4F), Moderate Pain (4 - 6)  ondansetron Injectable 4 milliGRAM(s) IV Push every 8 hours PRN Nausea and/or Vomiting      LABS:                        13.6   8.56  )-----------( 237      ( 01 Dec 2018 06:39 )             41.5     12-01    142  |  102  |  12  ----------------------------<  86  4.1   |  25  |  0.7    Ca    9.0      01 Dec 2018 06:39  Mg     2.3     12-01    TPro  6.8  /  Alb  4.3  /  TBili  1.0  /  DBili  x   /  AST  16  /  ALT  17  /  AlkPhos  95  12-01    PT/INR - ( 01 Dec 2018 06:39 )   PT: 13.60 sec;   INR: 1.25 ratio         PTT - ( 01 Dec 2018 06:39 )  PTT:31.4 sec        Neuro Imaging:  NCHCT:   < from: CT Head No Cont (12.01.18 @ 10:00) >    EXAM:  CT BRAIN            PROCEDURE DATE:  12/01/2018            INTERPRETATION:  Clinical History / Reason for exam: Persistent headaches.    Technique: Multiple contiguous axial CT images of the head were obtained   from the base of the skull tothe vertex without administration of   intravenous contrast.    Comparison: Noncontrast CT head dated 11/30/2018.      Findings: The ventricles, basal cisterns and sulcal pattern are unchanged   compared to previous study and within normal limits forpatient's stated   age. There is slight asymmetry of the lateral ventricles slightly more   prominent on the right compared to the left which may be a normal   variant.  There is a slight paucity of sulci which may be within the   upper limits of normal for patient's age. There are no cerebral,   cerebellar or mid brain parenchymal abnormalities.  There is no acute   mass effect, midline shift or hemorrhage.  No extra-axial fluid   collections are identified.    The bones of the calvarium are intact.  The paranasal sinuses, bilateral   mastoid complexes, globes and orbits are grossly within normal limits.    Beam hardening artifact is noted overlying the brain stem and posterior   fossa which is inherent to CT in this location.      IMPRESSION:     1.  Paucity of sulci which may be within upper normal limits for   patient's age, however if there is a clinical concern for subtle or small   subarachnoid hemorrhage or infection such as meningitis, MRI of the brain   with and without contrastmay be helpful for further evaluation.    2.  There is no contraindication to CSF correlation.    3.  No acute mass effect, midline shift or hemorrhage.     < end of copied text >    < from: MR Head No Cont (12.01.18 @ 15:08) >  EXAM:  MR BRAIN            PROCEDURE DATE:  12/01/2018            INTERPRETATION:  Clinical History / Reason for exam: Headache on   anticoagulation. Abnormal CT scan.    Technique: Sagittal and axial T1-weighted images, axial T2 weighted   images,axial FLAIR images, axial gradient echo images and axial   diffusion weighted images of the brain were obtained on the 1.5 Doreen   magnet without administration of intravenous contrast.    Comparison is made with previous noncontrast CT scans of the brain dated   11/30/2018 at 12/1/2018.      Findings:  The ventricles, basal cisterns and sulcal pattern are within   normal limits for patients stated age.  There is slight asymmetry of the   lateral ventricles slightly more prominent on the right compared to the   left which may be normal variant.     There are no cerebral, cerebellar or midbrain parenchymal signal   abnormalities.  There is no acute mass effect, midline shift or   hemorrhage.  No extra-axial fluid collections are identified.      There are no acute infarcts on diffusion weighted images.  Expected   signal flow voids are noted in the major intracranial vessels consistent   with their patency.      Globes and orbits are grossly within normal limits. The paranasal sinuses   and bilateral mastoid complexes are within normal limits.      There is no bone marrow signal abnormality.  The sella is unremarkable.      The cerebellar tonsils are slightly low-lying and terminate just below   the level of foramen magnum, however do not meet the criteria for Chiari   I malformation.      IMPRESSION:      1.  Normal MRI of the brain without contrast.    2.  No MR evidence of acute infarcts or intracranial hemorrhage.    < end of copied text >        Assessment / Plan: Patient with Headache and normal MRI brain  1. No further neuro workup  2. Tyelenol PRN HA

## 2018-12-01 NOTE — CONSULT NOTE ADULT - ASSESSMENT
33 year old female post partum presenting with PE, likely provoked.    PLAN  Continue anticoagulation with lovenox/ coumadin.  work up for thrombophilia can be done as outpt once pt is off of anticoagulation.  May send Factor V leiden, prothrombin gene mutation, lupus AC, anticardiolipin antibody and antiphospholipid Ab today.  Plan d/w hospitalist.

## 2018-12-01 NOTE — CHART NOTE - NSCHARTNOTEFT_GEN_A_CORE
Reviewed CAT scan result with radiologist. Artifact excludes absolute exclusion of small hemorrhage. Spoke to RN, patient is now sleeping and actually reported that headache is improving. At this point would repeat CAT scan if symptoms recur

## 2018-12-02 LAB
ANION GAP SERPL CALC-SCNC: 14 MMOL/L — SIGNIFICANT CHANGE UP (ref 7–14)
APPEARANCE UR: CLEAR — SIGNIFICANT CHANGE UP
APTT BLD: 31.2 SEC — SIGNIFICANT CHANGE UP (ref 27–39.2)
BASOPHILS # BLD AUTO: 0.03 K/UL — SIGNIFICANT CHANGE UP (ref 0–0.2)
BASOPHILS NFR BLD AUTO: 0.4 % — SIGNIFICANT CHANGE UP (ref 0–1)
BILIRUB UR-MCNC: NEGATIVE — SIGNIFICANT CHANGE UP
BUN SERPL-MCNC: 10 MG/DL — SIGNIFICANT CHANGE UP (ref 10–20)
CALCIUM SERPL-MCNC: 9 MG/DL — SIGNIFICANT CHANGE UP (ref 8.5–10.1)
CHLORIDE SERPL-SCNC: 104 MMOL/L — SIGNIFICANT CHANGE UP (ref 98–110)
CO2 SERPL-SCNC: 25 MMOL/L — SIGNIFICANT CHANGE UP (ref 17–32)
COLOR SPEC: YELLOW — SIGNIFICANT CHANGE UP
CREAT SERPL-MCNC: 0.6 MG/DL — LOW (ref 0.7–1.5)
DIFF PNL FLD: ABNORMAL
EOSINOPHIL # BLD AUTO: 0.34 K/UL — SIGNIFICANT CHANGE UP (ref 0–0.7)
EOSINOPHIL NFR BLD AUTO: 4.9 % — SIGNIFICANT CHANGE UP (ref 0–8)
GLUCOSE SERPL-MCNC: 92 MG/DL — SIGNIFICANT CHANGE UP (ref 70–99)
GLUCOSE UR QL: NEGATIVE MG/DL — SIGNIFICANT CHANGE UP
HCT VFR BLD CALC: 40.2 % — SIGNIFICANT CHANGE UP (ref 37–47)
HGB BLD-MCNC: 13.1 G/DL — SIGNIFICANT CHANGE UP (ref 12–16)
IMM GRANULOCYTES NFR BLD AUTO: 0.1 % — SIGNIFICANT CHANGE UP (ref 0.1–0.3)
INR BLD: 1.2 RATIO — SIGNIFICANT CHANGE UP (ref 0.65–1.3)
KETONES UR-MCNC: ABNORMAL
LEUKOCYTE ESTERASE UR-ACNC: ABNORMAL
LYMPHOCYTES # BLD AUTO: 1.99 K/UL — SIGNIFICANT CHANGE UP (ref 1.2–3.4)
LYMPHOCYTES # BLD AUTO: 28.9 % — SIGNIFICANT CHANGE UP (ref 20.5–51.1)
MAGNESIUM SERPL-MCNC: 1.9 MG/DL — SIGNIFICANT CHANGE UP (ref 1.8–2.4)
MCHC RBC-ENTMCNC: 28.7 PG — SIGNIFICANT CHANGE UP (ref 27–31)
MCHC RBC-ENTMCNC: 32.6 G/DL — SIGNIFICANT CHANGE UP (ref 32–37)
MCV RBC AUTO: 88 FL — SIGNIFICANT CHANGE UP (ref 81–99)
MONOCYTES # BLD AUTO: 0.55 K/UL — SIGNIFICANT CHANGE UP (ref 0.1–0.6)
MONOCYTES NFR BLD AUTO: 8 % — SIGNIFICANT CHANGE UP (ref 1.7–9.3)
NEUTROPHILS # BLD AUTO: 3.96 K/UL — SIGNIFICANT CHANGE UP (ref 1.4–6.5)
NEUTROPHILS NFR BLD AUTO: 57.7 % — SIGNIFICANT CHANGE UP (ref 42.2–75.2)
NITRITE UR-MCNC: NEGATIVE — SIGNIFICANT CHANGE UP
NRBC # BLD: 0 /100 WBCS — SIGNIFICANT CHANGE UP (ref 0–0)
PH UR: 6.5 — SIGNIFICANT CHANGE UP (ref 5–8)
PLATELET # BLD AUTO: 223 K/UL — SIGNIFICANT CHANGE UP (ref 130–400)
POTASSIUM SERPL-MCNC: 4.4 MMOL/L — SIGNIFICANT CHANGE UP (ref 3.5–5)
POTASSIUM SERPL-SCNC: 4.4 MMOL/L — SIGNIFICANT CHANGE UP (ref 3.5–5)
PROT UR-MCNC: NEGATIVE MG/DL — SIGNIFICANT CHANGE UP
PROTHROM AB SERPL-ACNC: 13 SEC — HIGH (ref 9.95–12.87)
RBC # BLD: 4.57 M/UL — SIGNIFICANT CHANGE UP (ref 4.2–5.4)
RBC # FLD: 13.6 % — SIGNIFICANT CHANGE UP (ref 11.5–14.5)
SODIUM SERPL-SCNC: 143 MMOL/L — SIGNIFICANT CHANGE UP (ref 135–146)
SP GR SPEC: 1.02 — SIGNIFICANT CHANGE UP (ref 1.01–1.03)
UROBILINOGEN FLD QL: 0.2 MG/DL — SIGNIFICANT CHANGE UP (ref 0.2–0.2)
WBC # BLD: 6.88 K/UL — SIGNIFICANT CHANGE UP (ref 4.8–10.8)
WBC # FLD AUTO: 6.88 K/UL — SIGNIFICANT CHANGE UP (ref 4.8–10.8)

## 2018-12-02 RX ORDER — WARFARIN SODIUM 2.5 MG/1
5 TABLET ORAL AT BEDTIME
Qty: 0 | Refills: 0 | Status: COMPLETED | OUTPATIENT
Start: 2018-12-02 | End: 2018-12-02

## 2018-12-02 RX ADMIN — CHLORHEXIDINE GLUCONATE 1 APPLICATION(S): 213 SOLUTION TOPICAL at 05:14

## 2018-12-02 RX ADMIN — ENOXAPARIN SODIUM 70 MILLIGRAM(S): 100 INJECTION SUBCUTANEOUS at 22:14

## 2018-12-02 RX ADMIN — ENOXAPARIN SODIUM 70 MILLIGRAM(S): 100 INJECTION SUBCUTANEOUS at 11:30

## 2018-12-02 RX ADMIN — WARFARIN SODIUM 5 MILLIGRAM(S): 2.5 TABLET ORAL at 21:18

## 2018-12-02 RX ADMIN — Medication 650 MILLIGRAM(S): at 05:18

## 2018-12-02 RX ADMIN — Medication 650 MILLIGRAM(S): at 06:00

## 2018-12-02 NOTE — PROGRESS NOTE ADULT - ASSESSMENT
34 yo female with PMH that includes recent delivery 9/2018 currently breastfeeding, who presented with pleuritic pain and fever and found with acute on chronic PE  she was started on anticoagulation and developed a significant headache, ruled out for bleeding. headache significantly improved. continues on anticoagulation      # Acute on chronic PE: BNP negative, serial CE negative. now on room air. given education on enoxaparin and warfarin and bleeding precs and diet  - cont therapeutic anticoagulation with lovenox 1mg/kg q12. she will try to learn injection techniques today  - warfarin tonight based on INR, f/u pending am labs  - nutrition to reinforce warfarin diet  - will need warfarin outpatient followup established. she sees Dr Lora at [facility]. will call PCP Monday  - f/u TTE, pending  - outpatient heme followup for thrombophilic w/u advised, so that w/u not be fragmented between inpatient and outpatient  - advised to cont to pump and discard for now and to reach out to OB and pediatrician re current situation      # Headache occipital: improved. MRI negative      # low grade temp: could be PE related. resolved.   ---- ROS neg for infection    # Serpiginous density in the right lower lobe, which may represent a vascular malformation. I d/w pulm and was advised does not represent an contra-indication to AC. outpatient pulm followp      # Coordination of care:  - VTE ppx: on systemic AC  - advised patient and  re no pregnancy while on coumadin, and given PE to d/w OB prior to any desired future pregnancy  - DC planning after echo and after outpatient anticoagulation plan established, ? 24-48 hrs 34 yo female with PMH that includes recent delivery 9/2018 currently breastfeeding, who presented with pleuritic pain and fever and found with acute on chronic PE  she was started on anticoagulation and developed a significant headache, ruled out for bleeding. headache significantly improved. continues on anticoagulation      # Acute on chronic PE: BNP negative, serial CE negative. now on room air. given education on enoxaparin and warfarin and bleeding precs and diet  - cont therapeutic anticoagulation with lovenox 1mg/kg q12. she will try to learn injection techniques today  - warfarin tonight based on INR, f/u pending am labs  - nutrition to reinforce warfarin diet  - will need warfarin outpatient followup established. she sees Dr Lora at [facility]. will call PCP Monday  - f/u TTE, pending  - outpatient heme followup for thrombophilic w/u advised, so that w/u not be fragmented between inpatient and outpatient  - advised to cont to pump and discard for now and to reach out to OB and pediatrician re current situation  - DC tele      # Headache occipital: improved. MRI negative    # low grade temp: could be PE related. resolved.     # Serpiginous density in the right lower lobe, which may represent a vascular malformation. I d/w pulm and was advised does not represent an contra-indication to AC. outpatient pulm followp      # Coordination of care:  - VTE ppx: on systemic AC  - advised patient and  re no pregnancy while on coumadin, and given PE to d/w OB prior to any desired future pregnancy  - DC planning after echo and after outpatient anticoagulation plan established, ? 24-48 hrs

## 2018-12-02 NOTE — PROGRESS NOTE ADULT - SUBJECTIVE AND OBJECTIVE BOX
CC: f/u headache and PE      INTERVAL EVENTS INCLUDE:  care coordinated with neuro and neuroradiology yesterday. after extensive w/u no bleeding found  RN tells me patient now willing to try learning enoxaparin injection and will be taught today  seen by heme who advised on labs and outpatient followup      ROS:        Vital Signs Last 24 Hrs  T(C): 36.2 (02 Dec 2018 06:00), Max: 36.7 (01 Dec 2018 22:24)  T(F): 97.2 (02 Dec 2018 06:00), Max: 98 (01 Dec 2018 22:24)  HR: 64 (02 Dec 2018 06:00) (56 - 98)  BP: 96/54 (02 Dec 2018 06:00) (96/54 - 107/53)  BP(mean): --  RR: 16 (02 Dec 2018 06:00) (16 - 16)  SpO2: --        PHYSICAL EXAM:  GENERAL: NAD, well-groomed, well-developed  HEAD = atraumatic, normoocehalic  EYES: conjunctiva and sclera clear  NECK: Supple, No JVD  NERVOUS SYSTEM:  Alert & Oriented X3, Good concentration, able to move all ext. there is aslight assymetry in medial deviation of left pupil, subtle and possibly chronic. tongue midline  CHEST/LUNG: Clear to ausculatation bilaterally; No rales, rhonchi, wheezing, or rubs  HEART: Regular rate and rhythm; No murmurs, rubs, or gallops  ABDOMEN: Soft, Nontender, Nondistended; Bowel sounds present  EXTREMITIES:  BL No clubbing, cyanosis, or edema BLE        DATA:    AM labs pending    BNP negative  serial cardiac enzymes negative      MRI brain:  1.  Normal MRI of the brain without contrast.  2.  No MR evidence of acute infarcts or intracranial hemorrhage. CC: f/u headache and PE      INTERVAL EVENTS INCLUDE:  care coordinated with neuro and neuroradiology yesterday. after extensive w/u no bleeding found  RN tells me patient now willing to try learning enoxaparin injection and will be taught today  seen by heme who advised on labs and outpatient followup      ROS:  headache resolved  no other complaints  we reviewed the need for close coordination with outpatient provider for ongouing coumadin and pediatrician with regards to breast feeding      Vital Signs Last 24 Hrs  T(C): 36.2 (02 Dec 2018 06:00), Max: 36.7 (01 Dec 2018 22:24)  T(F): 97.2 (02 Dec 2018 06:00), Max: 98 (01 Dec 2018 22:24)  HR: 64 (02 Dec 2018 06:00) (56 - 98)  BP: 96/54 (02 Dec 2018 06:00) (96/54 - 107/53)  BP(mean): --  RR: 16 (02 Dec 2018 06:00) (16 - 16)  SpO2: --        PHYSICAL EXAM:  GENERAL: NAD, well-groomed, well-developed  HEAD = atraumatic, normoocehalic  NECK: Supple, No JVD  NERVOUS SYSTEM:  Alert & Oriented X3, Good concentration  CHEST/LUNG: Clear to ausculatation bilaterally; No rales, rhonchi, wheezing, or rubs  HEART: Regular rate and rhythm; No murmurs, rubs, or gallops  ABDOMEN: Soft, Nontender  EXTREMITIES:  BL No clubbing, cyanosis, or edema BLE        DATA:    AM labs pending    BNP negative  serial cardiac enzymes negative      MRI brain:  1.  Normal MRI of the brain without contrast.  2.  No MR evidence of acute infarcts or intracranial hemorrhage.

## 2018-12-03 ENCOUNTER — INBOUND DOCUMENT (OUTPATIENT)
Age: 33
End: 2018-12-03

## 2018-12-03 ENCOUNTER — TRANSCRIPTION ENCOUNTER (OUTPATIENT)
Age: 33
End: 2018-12-03

## 2018-12-03 VITALS
RESPIRATION RATE: 16 BRPM | SYSTOLIC BLOOD PRESSURE: 110 MMHG | HEART RATE: 60 BPM | TEMPERATURE: 96 F | DIASTOLIC BLOOD PRESSURE: 53 MMHG

## 2018-12-03 PROBLEM — Z00.00 ENCOUNTER FOR PREVENTIVE HEALTH EXAMINATION: Status: ACTIVE | Noted: 2018-12-03

## 2018-12-03 LAB
ANION GAP SERPL CALC-SCNC: 13 MMOL/L — SIGNIFICANT CHANGE UP (ref 7–14)
APTT BLD: 32.4 SEC — SIGNIFICANT CHANGE UP (ref 27–39.2)
BASOPHILS # BLD AUTO: 0.03 K/UL — SIGNIFICANT CHANGE UP (ref 0–0.2)
BASOPHILS NFR BLD AUTO: 0.5 % — SIGNIFICANT CHANGE UP (ref 0–1)
BUN SERPL-MCNC: 13 MG/DL — SIGNIFICANT CHANGE UP (ref 10–20)
CALCIUM SERPL-MCNC: 9.5 MG/DL — SIGNIFICANT CHANGE UP (ref 8.5–10.1)
CHLORIDE SERPL-SCNC: 101 MMOL/L — SIGNIFICANT CHANGE UP (ref 98–110)
CO2 SERPL-SCNC: 27 MMOL/L — SIGNIFICANT CHANGE UP (ref 17–32)
CREAT SERPL-MCNC: 0.7 MG/DL — SIGNIFICANT CHANGE UP (ref 0.7–1.5)
CULTURE RESULTS: SIGNIFICANT CHANGE UP
EOSINOPHIL # BLD AUTO: 0.31 K/UL — SIGNIFICANT CHANGE UP (ref 0–0.7)
EOSINOPHIL NFR BLD AUTO: 5.1 % — SIGNIFICANT CHANGE UP (ref 0–8)
GLUCOSE SERPL-MCNC: 93 MG/DL — SIGNIFICANT CHANGE UP (ref 70–99)
HCT VFR BLD CALC: 42.5 % — SIGNIFICANT CHANGE UP (ref 37–47)
HGB BLD-MCNC: 14.3 G/DL — SIGNIFICANT CHANGE UP (ref 12–16)
IMM GRANULOCYTES NFR BLD AUTO: 0 % — LOW (ref 0.1–0.3)
INR BLD: 1.28 RATIO — SIGNIFICANT CHANGE UP (ref 0.65–1.3)
LYMPHOCYTES # BLD AUTO: 1.98 K/UL — SIGNIFICANT CHANGE UP (ref 1.2–3.4)
LYMPHOCYTES # BLD AUTO: 32.3 % — SIGNIFICANT CHANGE UP (ref 20.5–51.1)
MAGNESIUM SERPL-MCNC: 1.9 MG/DL — SIGNIFICANT CHANGE UP (ref 1.8–2.4)
MCHC RBC-ENTMCNC: 28.7 PG — SIGNIFICANT CHANGE UP (ref 27–31)
MCHC RBC-ENTMCNC: 33.6 G/DL — SIGNIFICANT CHANGE UP (ref 32–37)
MCV RBC AUTO: 85.2 FL — SIGNIFICANT CHANGE UP (ref 81–99)
MONOCYTES # BLD AUTO: 0.4 K/UL — SIGNIFICANT CHANGE UP (ref 0.1–0.6)
MONOCYTES NFR BLD AUTO: 6.5 % — SIGNIFICANT CHANGE UP (ref 1.7–9.3)
NEUTROPHILS # BLD AUTO: 3.41 K/UL — SIGNIFICANT CHANGE UP (ref 1.4–6.5)
NEUTROPHILS NFR BLD AUTO: 55.6 % — SIGNIFICANT CHANGE UP (ref 42.2–75.2)
NRBC # BLD: 0 /100 WBCS — SIGNIFICANT CHANGE UP (ref 0–0)
PLATELET # BLD AUTO: 243 K/UL — SIGNIFICANT CHANGE UP (ref 130–400)
POTASSIUM SERPL-MCNC: 4.4 MMOL/L — SIGNIFICANT CHANGE UP (ref 3.5–5)
POTASSIUM SERPL-SCNC: 4.4 MMOL/L — SIGNIFICANT CHANGE UP (ref 3.5–5)
PROTHROM AB SERPL-ACNC: 13.9 SEC — HIGH (ref 9.95–12.87)
RBC # BLD: 4.99 M/UL — SIGNIFICANT CHANGE UP (ref 4.2–5.4)
RBC # FLD: 13.2 % — SIGNIFICANT CHANGE UP (ref 11.5–14.5)
SODIUM SERPL-SCNC: 141 MMOL/L — SIGNIFICANT CHANGE UP (ref 135–146)
SPECIMEN SOURCE: SIGNIFICANT CHANGE UP
WBC # BLD: 6.13 K/UL — SIGNIFICANT CHANGE UP (ref 4.8–10.8)
WBC # FLD AUTO: 6.13 K/UL — SIGNIFICANT CHANGE UP (ref 4.8–10.8)

## 2018-12-03 RX ORDER — ENOXAPARIN SODIUM 100 MG/ML
80 INJECTION SUBCUTANEOUS
Qty: 14 | Refills: 0 | OUTPATIENT
Start: 2018-12-03

## 2018-12-03 RX ORDER — WARFARIN SODIUM 2.5 MG/1
1 TABLET ORAL
Qty: 3 | Refills: 0 | OUTPATIENT
Start: 2018-12-03

## 2018-12-03 RX ORDER — WARFARIN SODIUM 2.5 MG/1
7.5 TABLET ORAL AT BEDTIME
Qty: 0 | Refills: 0 | Status: DISCONTINUED | OUTPATIENT
Start: 2018-12-03 | End: 2018-12-03

## 2018-12-03 RX ADMIN — ENOXAPARIN SODIUM 70 MILLIGRAM(S): 100 INJECTION SUBCUTANEOUS at 11:29

## 2018-12-03 NOTE — DISCHARGE NOTE ADULT - PLAN OF CARE
sustained improvement continue to take lovenox and coumadin. followup with PCP tomorrow am. followup with hematologist Dr Herzog

## 2018-12-03 NOTE — DISCHARGE NOTE ADULT - CARE PROVIDER_API CALL
Audie Lora), Internal Medicine  [address] [place] [address]  Phone: [phone]  Fax: [phone]    Ami Herzog), Hematology; Internal Medicine; Medical Oncology  [address] [place] [id number]  Phone: [phone]  Fax: [phone]

## 2018-12-03 NOTE — PROGRESS NOTE ADULT - ASSESSMENT
34 yo female with PMH that includes recent delivery 9/2018 currently breastfeeding, who presented with pleuritic pain and fever and found with acute on chronic PE  she was started on anticoagulation and developed a significant headache, ruled out for bleeding. headache significantly improved. continues on anticoagulation      # Acute on chronic PE: BNP negative, serial CE negative. now on room air. given education on enoxaparin and warfarin and bleeding precs and diet  - cont therapeutic anticoagulation with lovenox 1mg/kg q12.   - coumadin 7.5mg tonight  - f/u TTE, if negative then initiate DC planning  - outpatient heme followup for thrombophilic w/u  - advised to cont to pump and discard for now and to reach out to OB and pediatrician re current situation    # DC planning steps  - will need warfarin outpatient followup established with PCP. see Dr Lora at [facility]  - Mrs Gillespie or her  to lean and be comfortable with lovenox injections  - need confirmation on lovenox injections      ### Serpiginous density in the right lower lobe, which may represent a vascular malformation. I d/w pulm and was advised does not represent an contra-indication to AC. outpatient pulm followp      # Coordination of care:  - VTE ppx: on systemic AC  - advised patient and  re no pregnancy while on coumadin  - Possible DC home later today vs tomorrow depending on the above 34 yo female with PMH that includes recent delivery 9/2018 currently breastfeeding, who presented with pleuritic pain and fever and found with acute on chronic PE  she was started on anticoagulation and developed a significant headache, ruled out for bleeding. headache significantly improved. continues on anticoagulation      # Acute on chronic PE: BNP negative, serial CE negative. now on room air. given education on enoxaparin and warfarin and bleeding precs and diet  - cont therapeutic anticoagulation with lovenox 1mg/kg q12.   - coumadin 7.5mg tonight  - f/u TTE, if negative then initiate DC planning  - outpatient heme followup for thrombophilic w/u. Dr Herzog  - advised to cont to pump and discard for now and to reach out to OB and pediatrician re current situation      # DC planning steps  - f/u TTE  - will need warfarin outpatient followup established with PCP. see Dr Lora at [facility]  - Mrs Gillespie or her  to lean and be comfortable with lovenox injections  - need confirmation on lovenox injections, pharmacy is [facility] on [address], [place] [address] [address]      # Serpiginous density in the right lower lobe, which may represent a vascular malformation. I d/w pulm and was advised does not represent an contra-indication to AC. outpatient pulm followp      # Coordination of care:  - VTE ppx: on systemic AC  - advised patient and  re no pregnancy while on coumadin  - Possible DC home later today vs tomorrow depending on the above

## 2018-12-03 NOTE — DISCHARGE NOTE ADULT - CARE PROVIDERS DIRECT ADDRESSES
[email or web address]

## 2018-12-03 NOTE — PROGRESS NOTE ADULT - SUBJECTIVE AND OBJECTIVE BOX
CC: f/u headache and PE      INTERVAL EVENTS INCLUDE:  no interval red flags reported      ROS:        Vital Signs Last 24 Hrs  T(C): 36.1 (03 Dec 2018 06:10), Max: 36.4 (02 Dec 2018 14:08)  T(F): 97 (03 Dec 2018 06:10), Max: 97.5 (02 Dec 2018 14:08)  HR: 62 (03 Dec 2018 06:10) (60 - 66)  BP: 102/51 (03 Dec 2018 06:10) (99/54 - 105/63)  BP(mean): --  RR: 16 (03 Dec 2018 06:10) (16 - 16)  SpO2: --        PHYSICAL EXAM:  GENERAL: NAD, well-groomed, well-developed  HEAD = atraumatic, normoocehalic  NECK: Supple, No JVD  NERVOUS SYSTEM:  Alert & Oriented X3, Good concentration  CHEST/LUNG: Clear to ausculatation bilaterally; No rales, rhonchi, wheezing, or rubs  HEART: Regular rate and rhythm; No murmurs, rubs, or gallops  ABDOMEN: Soft, Nontender  EXTREMITIES:  BL No clubbing, cyanosis, or edema BLE        DATA:    AM labs pending    BNP negative  serial cardiac enzymes negative      MRI brain:  1.  Normal MRI of the brain without contrast.  2.  No MR evidence of acute infarcts or intracranial hemorrhage.                            14.3   6.13  )-----------( 243      ( 03 Dec 2018 07:19 )             42.5       12-03    141  |  101  |  13  ----------------------------<  93  4.4   |  27  |  0.7    Ca    9.5      03 Dec 2018 07:19  Mg     1.9     12-03      PT/INR - ( 03 Dec 2018 07:19 )   PT: 13.90 sec;   INR: 1.28 ratio         PTT - ( 03 Dec 2018 07:19 )  PTT:32.4 sec CC: f/u headache and PE      INTERVAL EVENTS INCLUDE:  no interval red flags reported      ROS:  headache resolved today, was 2/10 overnuight  right sided chest pain improved, was able to sleep on her right side last night  she called her pediatrician and awaits callback  she received info on coumadin diet        Vital Signs Last 24 Hrs  T(C): 36.1 (03 Dec 2018 06:10), Max: 36.4 (02 Dec 2018 14:08)  T(F): 97 (03 Dec 2018 06:10), Max: 97.5 (02 Dec 2018 14:08)  HR: 62 (03 Dec 2018 06:10) (60 - 66)  BP: 102/51 (03 Dec 2018 06:10) (99/54 - 105/63)  BP(mean): --  RR: 16 (03 Dec 2018 06:10) (16 - 16)  SpO2: --        PHYSICAL EXAM:  GENERAL: NAD, well-groomed, well-developed  HEAD = atraumatic, normoocehalic  NECK: Supple, No JVD  NERVOUS SYSTEM:  Alert & Oriented X3, Good concentration  CHEST/LUNG: Clear to ausculatation bilaterally; No rales, rhonchi, wheezing, or rubs  HEART: Regular rate and rhythm; No murmurs, rubs, or gallops  ABDOMEN: Soft, Nontender  EXTREMITIES:  BL No clubbing, cyanosis, or edema BLE        DATA:    AM labs pending    BNP negative  serial cardiac enzymes negative      MRI brain:  1.  Normal MRI of the brain without contrast.  2.  No MR evidence of acute infarcts or intracranial hemorrhage.                            14.3   6.13  )-----------( 243      ( 03 Dec 2018 07:19 )             42.5       12-03    141  |  101  |  13  ----------------------------<  93  4.4   |  27  |  0.7    Ca    9.5      03 Dec 2018 07:19  Mg     1.9     12-03      PT/INR - ( 03 Dec 2018 07:19 )   PT: 13.90 sec;   INR: 1.28 ratio         PTT - ( 03 Dec 2018 07:19 )  PTT:32.4 sec

## 2018-12-03 NOTE — DISCHARGE NOTE ADULT - OTHER SIGNIFICANT FINDINGS
32 yo female with PMH that includes recent delivery 9/2018 currently breastfeeding, who presented with pleuritic pain and fever and found with acute on chronic PE  she was started on anticoagulation and developed a significant headache, ruled out for bleeding. headache significantly improved. continues on anticoagulation      # Acute on chronic PE: BNP negative, serial CE negative. now on room air. given education on enoxaparin and warfarin and bleeding precs and diet  - cont therapeutic anticoagulation with lovenox 1mg/kg q12.   - coumadin 7.5mg tonight  - f/u TTE, if negative then initiate DC planning  - outpatient heme followup for thrombophilic w/u. Dr Herzog  - advised to cont to pump and discard for now and to reach out to OB and pediatrician re current situation      # DC planning steps  - f/u TTE  - will need warfarin outpatient followup established with PCP. see Dr Lora at [facility]  - Mrs Gillespie or her  to lean and be comfortable with lovenox injections  - need confirmation on lovenox injections, pharmacy is [facility] on [address], [place] [id number]      # Serpiginous density in the right lower lobe, which may represent a vascular malformation. I d/w pulm and was advised does not represent an contra-indication to AC. outpatient pulm followp      # Coordination of care:  - VTE ppx: on systemic AC  - advised patient and  re no pregnancy while on coumadin  - Possible DC home later today vs tomorrow depending on the above      Electronic Signatures for Addendum Section:   Lita Steve) (Signed Addendum 03-Dec-2018 11:41)  	spoke with PCP Dr Lora and informed him of INR and situation  he would like to see her the day after discharge  Mrs Gillespie will call to make appt now  TTE still pending  Lita Steve) (Signed Addendum 03-Dec-2018 12:48)  	appt made with PCP for 10:15am    called echo lab: normal fxn, mild TR. no RV strain    will send Rx to pharmacy  Lita Steve) (Signed Addendum 03-Dec-2018 14:49)  	does not have insurance coverage for lovenox  [facility] [employer] [facility]  she also did some shopping around and was able to secure a cupon for 40$ copay for the first 3 days  she would rather pay OOB than stay inhouse  she had PCP appt tomorrow 10:15am   has learned and is comfortable with injections    she was able to tell me about diet, vitamin k, adverse reactions to watch out for, planned follow up  she has also spoken to pediatrician re when to resume breastfeeding    stable for discharge. I spent > 30 minutes on discharge planning, including collaboration with the interdisciplinary team, preparation of discharge paperwork, discharge interview / exam, and discussion with patient /  regarding hospital course, post discharge care, warning signs necessitating return to the hospital, and the importance of outpatient followup. Teachback method used. All questions answered.    Electronic Signatures:  Lita Steve)  (Signed 03-Dec-2018 10:49)  	Authored: Progress Note, Reason for Admission, Subjective and Objective, Assessment and Plan      Last Updated: 03-Dec-2018 14:49 by Lita Steve) 32 yo female with PMH that includes recent delivery 9/2018 currently breastfeeding, who presented with pleuritic pain and fever and found with acute on chronic PE  she was started on anticoagulation and developed a significant headache, ruled out for bleeding. headache significantly improved. continues on anticoagulation. brain MRI negative      discharge diagnosis: Acute on chronic PE:   BNP negative, serial CE negative. now on room air. given education on enoxaparin and warfarin and bleeding precs and diet  she is being discharge on theraputic lovenox and coumadin.   INR on day of dicharge is 1.3 after receiving coumadin 5mg po qhs x3 doses    outpatient heme followup for thrombophilic w/u. Dr Herzog    advised to cont to pump and discard for now and to reach out to OB and pediatrician re current situation    Outpatient followup with Dr Lora tomorrow am    Serpiginous density in the right lower lobe, which may represent a vascular malformation. I d/w pulm and was advised does not represent an contra-indication to AC. outpatient pulm followp advised    echo: normal fxn, mild TR. no RV strain      RECENT DATA INCLUDES:                          14.3   6.13  )-----------( 243      ( 03 Dec 2018 07:19 )             42.5     12-03    141  |  101  |  13  ----------------------------<  93  4.4   |  27  |  0.7    Ca    9.5      03 Dec 2018 07:19  Mg     1.9     12-03    PT/INR - ( 03 Dec 2018 07:19 )   PT: 13.90 sec;   INR: 1.28 ratio      PTT - ( 03 Dec 2018 07:19 )  PTT:32.4 sec      CT chest:  1. Acute on chronic segmental and subsegmental pulmonary embolism involving the right lower lobe and right upper lobe pulmonary arteries.  2. Serpiginous density in the right lower lobe, which may represent a vascular malformation.

## 2018-12-03 NOTE — DISCHARGE NOTE ADULT - PATIENT PORTAL LINK FT
You can access the [facility] Patient Portal, offered by [facility], by registering with the following website: [email or web address]

## 2018-12-03 NOTE — DISCHARGE NOTE ADULT - CARE PLAN
Principal Discharge DX:	Other acute pulmonary embolism without acute cor pulmonale  Goal:	sustained improvement  Assessment and plan of treatment:	continue to take lovenox and coumadin. followup with PCP tomorrow am. followup with hematologist Dr Herzog  Secondary Diagnosis:	Postpartum state

## 2018-12-03 NOTE — DISCHARGE NOTE ADULT - MEDICATION SUMMARY - MEDICATIONS TO TAKE
I will START or STAY ON the medications listed below when I get home from the hospital:    enoxaparin 80 mg/0.8 mL injectable solution  -- 80 milligram(s) injectable 2 times a day  -- Indication: For Pulmonary embolism    warfarin 7.5 mg oral tablet  -- 1 tab(s) by mouth once a day (at bedtime)  -- Indication: For Pulmonary embolism

## 2018-12-05 DIAGNOSIS — I26.99 OTHER PULMONARY EMBOLISM WITHOUT ACUTE COR PULMONALE: ICD-10-CM

## 2018-12-05 DIAGNOSIS — R09.02 HYPOXEMIA: ICD-10-CM

## 2018-12-05 DIAGNOSIS — I27.82 CHRONIC PULMONARY EMBOLISM: ICD-10-CM

## 2019-02-25 NOTE — CONSULT NOTE ADULT - CONSULT REQUESTED DATE/TIME
[facility]    Home Care Following Endoscopy          Activity:    You have just undergone an endoscopic procedure performed with Monitored Anesthesia sedation.  Do not work or operate machinery (including a car) or drink alcohol (including beer) for at least 12 hours.      Diet:    Return to the diet you were on before your procedure but eat lightly for the first 12-24 hours.    Drink plenty of water.    Resume any regular medications unless otherwise advised by your physician.      You had biopsies removed so please refrain from aspirin or aspirin products for 2 days.   .   Pain:    You may take Tylenol as needed for pain.  Expected Recovery:    You can expect some mild abdominal fullness and/or discomfort due to the air used to inflate your intestinal tract. It is also normal to have a mild sore throat after upper endoscopy.    Call Your Physician if You Have:    After Upper Endoscopy:  o Shoulder, back or chest pain.  o Difficulty breathing or swallowing.  o Vomiting blood.     Any questions or concerns about your recovery, please call [phone] or after hours [phone] Nurse Advice Line.    Follow-up Care:    You should receive a call or letter with your results within 1 - 2 weeks. Please call if you have not received a notification of your results.  If asked to return to clinic please make an appointment 1 week after your procedure.  Call [phone].    01-Dec-2018 16:39

## 2020-02-12 ENCOUNTER — RESULT REVIEW (OUTPATIENT)
Age: 35
End: 2020-02-12

## 2021-11-14 ENCOUNTER — TRANSCRIPTION ENCOUNTER (OUTPATIENT)
Age: 36
End: 2021-11-14

## 2021-11-20 ENCOUNTER — TRANSCRIPTION ENCOUNTER (OUTPATIENT)
Age: 36
End: 2021-11-20

## 2022-10-20 ENCOUNTER — NON-APPOINTMENT (OUTPATIENT)
Age: 37
End: 2022-10-20

## 2023-01-12 ENCOUNTER — APPOINTMENT (OUTPATIENT)
Dept: OBGYN | Facility: CLINIC | Age: 38
End: 2023-01-12
Payer: COMMERCIAL

## 2023-01-12 ENCOUNTER — NON-APPOINTMENT (OUTPATIENT)
Age: 38
End: 2023-01-12

## 2023-01-12 VITALS
HEIGHT: 66 IN | SYSTOLIC BLOOD PRESSURE: 121 MMHG | DIASTOLIC BLOOD PRESSURE: 73 MMHG | TEMPERATURE: 98 F | HEART RATE: 66 BPM | BODY MASS INDEX: 25.07 KG/M2 | WEIGHT: 156 LBS

## 2023-01-12 VITALS
TEMPERATURE: 98 F | HEIGHT: 66 IN | WEIGHT: 156 LBS | BODY MASS INDEX: 25.07 KG/M2 | DIASTOLIC BLOOD PRESSURE: 73 MMHG | HEART RATE: 66 BPM | SYSTOLIC BLOOD PRESSURE: 121 MMHG

## 2023-01-12 VITALS
HEART RATE: 66 BPM | HEIGHT: 66 IN | BODY MASS INDEX: 25.07 KG/M2 | SYSTOLIC BLOOD PRESSURE: 121 MMHG | WEIGHT: 156 LBS | DIASTOLIC BLOOD PRESSURE: 73 MMHG | TEMPERATURE: 98 F

## 2023-01-12 DIAGNOSIS — Q78.2 OSTEOPETROSIS: ICD-10-CM

## 2023-01-12 DIAGNOSIS — Z12.39 ENCOUNTER FOR OTHER SCREENING FOR MALIGNANT NEOPLASM OF BREAST: ICD-10-CM

## 2023-01-12 DIAGNOSIS — Z87.891 PERSONAL HISTORY OF NICOTINE DEPENDENCE: ICD-10-CM

## 2023-01-12 DIAGNOSIS — Z78.9 OTHER SPECIFIED HEALTH STATUS: ICD-10-CM

## 2023-01-12 DIAGNOSIS — Z80.3 FAMILY HISTORY OF MALIGNANT NEOPLASM OF BREAST: ICD-10-CM

## 2023-01-12 LAB
BILIRUB UR QL STRIP: NEGATIVE
CLARITY UR: CLEAR
COLLECTION METHOD: NORMAL
GLUCOSE UR-MCNC: NEGATIVE
HCG UR QL: 0.2 EU/DL
HGB UR QL STRIP.AUTO: NORMAL
KETONES UR-MCNC: NEGATIVE
LEUKOCYTE ESTERASE UR QL STRIP: NEGATIVE
NITRITE UR QL STRIP: NEGATIVE
PH UR STRIP: 5.5
PROT UR STRIP-MCNC: NORMAL
SP GR UR STRIP: 1.02

## 2023-01-12 PROCEDURE — 81003 URINALYSIS AUTO W/O SCOPE: CPT | Mod: QW

## 2023-01-12 PROCEDURE — 99385 PREV VISIT NEW AGE 18-39: CPT

## 2023-01-12 NOTE — HISTORY OF PRESENT ILLNESS
[N] : Patient reports normal menses [Vasectomy (partner)] : has a partner with a vasectomy [Monogamous (Male Partner)] : is monogamous with a male partner [Y] : Positive pregnancy history [PapSmeardate] : 2/2020 [LMPDate] : 1/7/23 [MensesFreq] : 28 [MensesLength] : 5 [MensesAmount] : mod [PGHxTotal] : 3 [Banneriving] : 3 [Regular Cycle Intervals] : periods have been regular [FreeTextEntry1] : 1/7/23 [Currently Active] : currently active [Men] : men [No] : No

## 2023-01-12 NOTE — PHYSICAL EXAM
[Chaperone Present] : A chaperone was present in the examining room during all aspects of the physical examination [Appropriately responsive] : appropriately responsive [Alert] : alert [No Acute Distress] : no acute distress [Soft] : soft [Non-tender] : non-tender [Non-distended] : non-distended [Oriented x3] : oriented x3 [Examination Of The Breasts] : a normal appearance [No Masses] : no breast masses were palpable [Labia Majora] : normal [Labia Minora] : normal [Old Blood] : old blood was present in the vagina [Normal] : normal [Uterine Adnexae] : normal [FreeTextEntry4] : end of menses

## 2023-01-17 LAB
C TRACH RRNA SPEC QL NAA+PROBE: NOT DETECTED
CYTOLOGY CVX/VAG DOC THIN PREP: NORMAL
HPV HIGH+LOW RISK DNA PNL CVX: NOT DETECTED
N GONORRHOEA RRNA SPEC QL NAA+PROBE: NOT DETECTED
SOURCE AMPLIFICATION: NORMAL

## 2023-01-20 ENCOUNTER — NON-APPOINTMENT (OUTPATIENT)
Age: 38
End: 2023-01-20

## 2023-03-13 ENCOUNTER — NON-APPOINTMENT (OUTPATIENT)
Age: 38
End: 2023-03-13

## 2023-03-20 DIAGNOSIS — N64.89 OTHER SPECIFIED DISORDERS OF BREAST: ICD-10-CM

## 2024-06-03 ENCOUNTER — APPOINTMENT (OUTPATIENT)
Dept: OBGYN | Facility: CLINIC | Age: 39
End: 2024-06-03
Payer: COMMERCIAL

## 2024-06-03 VITALS
BODY MASS INDEX: 25.88 KG/M2 | WEIGHT: 161 LBS | DIASTOLIC BLOOD PRESSURE: 72 MMHG | HEIGHT: 66 IN | HEART RATE: 91 BPM | SYSTOLIC BLOOD PRESSURE: 109 MMHG

## 2024-06-03 DIAGNOSIS — N83.201 UNSPECIFIED OVARIAN CYST, RIGHT SIDE: ICD-10-CM

## 2024-06-03 DIAGNOSIS — Z01.419 ENCOUNTER FOR GYNECOLOGICAL EXAMINATION (GENERAL) (ROUTINE) W/OUT ABNORMAL FINDINGS: ICD-10-CM

## 2024-06-03 LAB
BILIRUB UR QL STRIP: NEGATIVE
CLARITY UR: CLEAR
COLLECTION METHOD: NORMAL
GLUCOSE UR-MCNC: NEGATIVE
HCG UR QL: 1 EU/DL
HGB UR QL STRIP.AUTO: NEGATIVE
KETONES UR-MCNC: NEGATIVE
LEUKOCYTE ESTERASE UR QL STRIP: NEGATIVE
NITRITE UR QL STRIP: NEGATIVE
PH UR STRIP: 7.5
PROT UR STRIP-MCNC: NORMAL
SP GR UR STRIP: 1.02

## 2024-06-03 PROCEDURE — 76830 TRANSVAGINAL US NON-OB: CPT

## 2024-06-03 PROCEDURE — 99395 PREV VISIT EST AGE 18-39: CPT | Mod: 25

## 2024-06-03 PROCEDURE — 99459 PELVIC EXAMINATION: CPT

## 2024-06-03 PROCEDURE — 81003 URINALYSIS AUTO W/O SCOPE: CPT | Mod: QW

## 2024-06-03 NOTE — HISTORY OF PRESENT ILLNESS
[N] : Patient reports normal menses [Vasectomy (partner)] : has a partner with a vasectomy [Y] : Positive pregnancy history [LMPDate] : 05/21/24 [MensesFreq] : 28 [MensesLength] : 5 [MensesAmount] : light [PGHxTotal] : 3 [[facility]Term] : 3 [[facility]iving] : 3 [Normal Amount/Duration] :  normal amount and duration [Regular Cycle Intervals] : periods have been regular [FreeTextEntry1] : 05/21/24 [Currently Active] : currently active [Men] : men [Vaginal] : vaginal [No] : No

## 2024-06-03 NOTE — PHYSICAL EXAM
[Chaperone Present] : A chaperone was present in the examining room during all aspects of the physical examination [[id number]] : A chaperone was present during the pelvic exam. [FreeTextEntry2] : JUDITH Ivory [Appropriately responsive] : appropriately responsive [Alert] : alert [No Acute Distress] : no acute distress [Soft] : soft [Non-tender] : non-tender [Non-distended] : non-distended [Oriented x3] : oriented x3 [Examination Of The Breasts] : a normal appearance [No Discharge] : no discharge [No Masses] : no breast masses were palpable [Labia Majora] : normal [Labia Minora] : normal [Normal] : normal [Uterine Adnexae] : normal

## 2024-06-03 NOTE — PROCEDURE
[Cervical Pap Smear] : cervical Pap smear [Liquid Base] : liquid base [GC & Chlamydia via Pap] : GC & Chlamydia via Pap [Tolerated Well] : the patient tolerated the procedure well [No Complications] : there were no complications [Suspected Ovarian Cyst] : suspected ovarian cyst [Transvaginal Ultrasound] : transvaginal ultrasound [L: ___ cm] : L: [unfilled] cm [W: ___cm] : W: [unfilled] cm [H: ___ cm] : H: [unfilled] cm [FreeTextEntry5] : 99.46cc vol [FreeTextEntry7] : 8.29cc vol  2.26cc vol cyst [FreeTextEntry8] : 2.15cc vol

## 2024-06-04 ENCOUNTER — TRANSCRIPTION ENCOUNTER (OUTPATIENT)
Age: 39
End: 2024-06-04

## 2024-06-07 LAB
C TRACH RRNA SPEC QL NAA+PROBE: NOT DETECTED
CYTOLOGY CVX/VAG DOC THIN PREP: NORMAL
HPV HIGH+LOW RISK DNA PNL CVX: NOT DETECTED
N GONORRHOEA RRNA SPEC QL NAA+PROBE: NOT DETECTED
SOURCE TP AMPLIFICATION: NORMAL

## 2024-08-06 ENCOUNTER — NON-APPOINTMENT (OUTPATIENT)
Age: 39
End: 2024-08-06

## 2024-08-06 PROBLEM — R92.1 BREAST CALCIFICATIONS ON MAMMOGRAM: Status: ACTIVE | Noted: 2024-08-06

## 2024-09-06 ENCOUNTER — NON-APPOINTMENT (OUTPATIENT)
Age: 39
End: 2024-09-06

## 2024-12-16 ENCOUNTER — APPOINTMENT (OUTPATIENT)
Dept: PAIN MANAGEMENT | Facility: CLINIC | Age: 39
End: 2024-12-16

## 2024-12-30 ENCOUNTER — APPOINTMENT (OUTPATIENT)
Dept: ORTHOPEDIC SURGERY | Facility: CLINIC | Age: 39
End: 2024-12-30

## 2025-03-26 DIAGNOSIS — R92.8 OTHER ABNORMAL AND INCONCLUSIVE FINDINGS ON DIAGNOSTIC IMAGING OF BREAST: ICD-10-CM

## 2025-06-23 ENCOUNTER — APPOINTMENT (OUTPATIENT)
Dept: OBGYN | Facility: CLINIC | Age: 40
End: 2025-06-23
Payer: COMMERCIAL

## 2025-06-23 VITALS
SYSTOLIC BLOOD PRESSURE: 122 MMHG | HEIGHT: 66 IN | WEIGHT: 168 LBS | BODY MASS INDEX: 27 KG/M2 | HEART RATE: 76 BPM | DIASTOLIC BLOOD PRESSURE: 77 MMHG

## 2025-06-23 LAB
BILIRUB UR QL STRIP: NEGATIVE
CLARITY UR: CLEAR
COLLECTION METHOD: NORMAL
GLUCOSE UR-MCNC: NEGATIVE
HCG UR QL: 0.2 EU/DL
HGB UR QL STRIP.AUTO: NEGATIVE
KETONES UR-MCNC: NEGATIVE
LEUKOCYTE ESTERASE UR QL STRIP: NEGATIVE
NITRITE UR QL STRIP: NEGATIVE
PH UR STRIP: 6.5
PROT UR STRIP-MCNC: ABNORMAL
SP GR UR STRIP: 1.02

## 2025-06-23 PROCEDURE — 81003 URINALYSIS AUTO W/O SCOPE: CPT | Mod: QW

## 2025-06-23 PROCEDURE — 99459 PELVIC EXAMINATION: CPT | Mod: NC

## 2025-06-23 PROCEDURE — 99396 PREV VISIT EST AGE 40-64: CPT | Mod: 25

## 2025-06-23 NOTE — HISTORY OF PRESENT ILLNESS
[N] : Patient reports normal menses [Monogamous (Male Partner)] : is monogamous with a male partner [Y] : Patient uses contraception [Vasectomy (partner)] : has a partner with a vasectomy [PapSmeardate] : 2/2020 [LMPDate] : 1/7/23 [MensesFreq] : 28 [MensesLength] : 5 [MensesAmount] : mod [PGHxTotal] : 3 [[facility] Maryvaleiving] : 3 [Regular Cycle Intervals] : periods have been regular [FreeTextEntry1] : 6/3/25 [Currently Active] : currently active [Men] : men [No] : No

## 2025-06-23 NOTE — PHYSICAL EXAM
[Chaperoned Physical Exam] : A chaperone was present in the examining room during all aspects of the physical examination. [MA] : MA [FreeTextEntry2] : KENDALL Monroy [Appropriately responsive] : appropriately responsive [Alert] : alert [No Acute Distress] : no acute distress [Soft] : soft [Non-tender] : non-tender [Non-distended] : non-distended [Oriented x3] : oriented x3 [Examination Of The Breasts] : a normal appearance [No Discharge] : no discharge [No Masses] : no breast masses were palpable [Labia Majora] : normal [Labia Minora] : normal [Old Blood] : old blood was present in the vagina [Normal] : normal [Uterine Adnexae] : normal [FreeTextEntry4] : end of menses

## 2025-06-28 LAB
C TRACH RRNA SPEC QL NAA+PROBE: NOT DETECTED
HPV HIGH+LOW RISK DNA PNL CVX: NOT DETECTED
N GONORRHOEA RRNA SPEC QL NAA+PROBE: NOT DETECTED
SOURCE TP AMPLIFICATION: NORMAL

## 2025-08-18 DIAGNOSIS — Z09 ENCOUNTER FOR FOLLOW-UP EXAMINATION AFTER COMPLETED TREATMENT FOR CONDITIONS OTHER THAN MALIGNANT NEOPLASM: ICD-10-CM
